# Patient Record
Sex: FEMALE | Race: WHITE | NOT HISPANIC OR LATINO | Employment: UNEMPLOYED | URBAN - METROPOLITAN AREA
[De-identification: names, ages, dates, MRNs, and addresses within clinical notes are randomized per-mention and may not be internally consistent; named-entity substitution may affect disease eponyms.]

---

## 2017-11-06 ENCOUNTER — CONVERSION ENCOUNTER (OUTPATIENT)
Dept: GENERAL RADIOLOGY | Facility: HOSPITAL | Age: 57
End: 2017-11-06

## 2018-08-28 ENCOUNTER — OFFICE VISIT CONVERTED (OUTPATIENT)
Dept: OTOLARYNGOLOGY | Facility: CLINIC | Age: 58
End: 2018-08-28
Attending: OTOLARYNGOLOGY

## 2018-10-08 ENCOUNTER — OFFICE VISIT CONVERTED (OUTPATIENT)
Dept: OTOLARYNGOLOGY | Facility: CLINIC | Age: 58
End: 2018-10-08
Attending: OTOLARYNGOLOGY

## 2019-02-05 ENCOUNTER — HOSPITAL ENCOUNTER (OUTPATIENT)
Dept: GENERAL RADIOLOGY | Facility: HOSPITAL | Age: 59
Discharge: HOME | End: 2019-02-05
Attending: NURSE PRACTITIONER

## 2019-11-05 ENCOUNTER — HOSPITAL ENCOUNTER (OUTPATIENT)
Dept: URGENT CARE | Facility: CLINIC | Age: 59
Discharge: HOME | End: 2019-11-05
Attending: PHYSICIAN ASSISTANT

## 2020-02-12 ENCOUNTER — HOSPITAL ENCOUNTER (OUTPATIENT)
Dept: GENERAL RADIOLOGY | Facility: HOSPITAL | Age: 60
Discharge: HOME | End: 2020-02-12
Attending: NURSE PRACTITIONER

## 2021-03-03 ENCOUNTER — HOSPITAL ENCOUNTER (OUTPATIENT)
Dept: MAMMOGRAPHY | Facility: HOSPITAL | Age: 61
Discharge: HOME | End: 2021-03-03
Attending: NURSE PRACTITIONER

## 2021-05-11 ENCOUNTER — OFFICE VISIT CONVERTED (OUTPATIENT)
Dept: ORTHOPEDIC SURGERY | Facility: CLINIC | Age: 61
End: 2021-05-11
Attending: ORTHOPAEDIC SURGERY

## 2021-05-11 ENCOUNTER — CONVERSION ENCOUNTER (OUTPATIENT)
Dept: ORTHOPEDIC SURGERY | Facility: CLINIC | Age: 61
End: 2021-05-11

## 2021-05-16 VITALS
HEIGHT: 60 IN | SYSTOLIC BLOOD PRESSURE: 144 MMHG | OXYGEN SATURATION: 98 % | HEART RATE: 64 BPM | TEMPERATURE: 98.4 F | RESPIRATION RATE: 16 BRPM | BODY MASS INDEX: 25.13 KG/M2 | DIASTOLIC BLOOD PRESSURE: 85 MMHG | WEIGHT: 128 LBS

## 2021-05-16 VITALS
WEIGHT: 128 LBS | BODY MASS INDEX: 25.13 KG/M2 | RESPIRATION RATE: 16 BRPM | TEMPERATURE: 98.1 F | SYSTOLIC BLOOD PRESSURE: 148 MMHG | OXYGEN SATURATION: 99 % | DIASTOLIC BLOOD PRESSURE: 80 MMHG | HEIGHT: 60 IN | HEART RATE: 66 BPM

## 2021-06-03 ENCOUNTER — OFFICE VISIT CONVERTED (OUTPATIENT)
Dept: ORTHOPEDIC SURGERY | Facility: CLINIC | Age: 61
End: 2021-06-03
Attending: PHYSICIAN ASSISTANT

## 2021-06-05 NOTE — PROGRESS NOTES
Progress Note      Patient Name: Leeanna Jackson   Patient ID: [id number]   Sex: Female   Birthdate: Connie 3, 1960    Primary Care Provider: Марина MCARTHUR   Referring Provider: Марина MCARTHUR    Visit Date: Connie 3, 2021    Provider: Zaynab Lama PA-C   Location: [facility]   Location Address: [address] [place] [address]  [id number]   Location Phone: [phone]          Chief Complaint  · Follow up right ankle pain      History Of Present Illness  Leeanna Jackson is a 61 year old /White female who presents today to [facility]. Patient presents today for follow up of right distal fibula fracture sustained on 05/02/21. Patient has been in a short leg cast for the past 3 weeks and NWB. She states she has tolerated the cast well. She denies any pain at this time.       Past Medical History  Bronchitis; Chronic rhinitis; Chronic rhinosinusitis; Foot cramps; Fracture; Headache; Hearing loss; High cholesterol; Hyperlipemia; Reflux; Thyroid disorder; Tinnitus, bilateral         Past Surgical History  Appendectomy; Colonoscopy; EGD; Hysterectomy         Medication List  [place] Thyroid 30 mg oral tablet; azelastine 137 mcg (0.1 %) nasal aerosol,spray; multivitamin oral tablet; Vitamin D2 1,250 mcg (50,000 unit) oral capsule         Allergy List  NO KNOWN DRUG ALLERGIES; No known history of drug allergy       Allergies Reconciled  Family Medical History  Colon Neoplasm, Malignant; Heart Disease; Hypertension; Cancer, Unspecified; Breast Neoplasm; Prostate cancer; Osteoporosis         Social History  Alcohol (Never); Alcohol Use (Never); Homemaker.; lives with spouse; .; Recreational Drug Use (Never); Tobacco (Never)         Review of Systems  · Constitutional  o Denies  o : fever, chills, weight loss  · Cardiovascular  o Denies  o : chest pain, shortness of breath  · Gastrointestinal  o Denies  o : liver disease, heartburn, nausea, blood in  stools  · Genitourinary  o Denies  o : painful urination, blood in urine  · Integument  o Denies  o : rash, itching  · Neurologic  o Denies  o : headache, weakness, loss of consciousness  · Musculoskeletal  o Denies  o : painful, swollen joints  · Psychiatric  o Denies  o : drug/alcohol addiction, anxiety, depression      Vitals  Date Time BP Position Site L\R Cuff Size HR RR TEMP (F) WT  HT  BMI kg/m2 BSA m2 O2 Sat FR L/min FiO2 HC       06/03/2021 02:31 PM      78 - R   130lbs 0oz 5'   25.39 1.58 97 %            Physical Examination  · Constitutional  o Appearance  o : well developed, well-nourished, no obvious deformities present  · Head and Face  o Head  o :   § Inspection  § : normocephalic  o Face  o :   § Inspection  § : no facial lesions  · Eyes  o Conjunctivae  o : conjunctivae normal  o Sclerae  o : sclerae white  · Ears, Nose, Mouth and Throat  o Ears  o :   § External Ears  § : appearance within normal limits  § Hearing  § : intact  o Nose  o :   § External Nose  § : appearance normal  · Neck  o Inspection/Palpation  o : normal appearance  o Range of Motion  o : full range of motion  · Respiratory  o Respiratory Effort  o : breathing unlabored  o Inspection of Chest  o : normal appearance  o Auscultation of Lungs  o : no audible wheezing or rales  · Cardiovascular  o Heart  o : regular rate  · Gastrointestinal  o Abdominal Examination  o : soft and non-tender  · Skin and Subcutaneous Tissue  o General Inspection  o : intact, no rashes  · Psychiatric  o General  o : Alert and oriented x3  o Judgement and Insight  o : judgment and insight intact  o Mood and Affect  o : mood normal, affect appropriate  · Right Ankle/Foot  o Inspection  o : Ecchymosis on plantar aspect of foot. Mild swelling of dorsal aspect of foot. Patient able to plantar and dorsiflex the ankle with no pain. Patient able to wiggle toes. Sensation intact. Neurovascular intact. Posterior tibialis pulse 2+.  · In Office  Procedures  o View  o : AP/LATERAL  o Site  o : right, ankle   o Indication  o : Right ankle pain   o Study  o : X-rays ordered, taken in the office, and reviewed today.  o Xray  o : Well healing distal fibula fracture   o Comparative Data  o : Comparative Data found and reviewed today               Assessment  · Ankle fracture     824.8/S82.899A  · Right ankle pain     719.47/M25.571      Plan  · Orders  o Ankle (Right) 2 views X-Ray ([id number]) - 719.47/M25.571 - 06/03/2021  · Instructions  o Reviewed the patient's Past Medical, Social, and Family history as well as the ROS at today's visit, no changes.  o Call or return if worsening symptoms.  o X-ray ordered, taken and reviewed at this visit.  o Follow Up in 3 weeks.  o Patient placed in a walking boot today. WBAT. She will do gentle ROM exercises 3 times daily. Follow up in 3 weeks with repeat films at that time.  · Referrals  o ID: [id number] Date: 05/05/2021 Type: Inbound  Specialty: Orthopedic Surgery            Electronically Signed by: Zaynab Lama PA-C -Author on Connie 3, 2021 03:41:48 PM

## 2021-06-05 NOTE — H&P
History and Physical      Patient Name: Leeanna Jackson   Patient ID: [id number]   Sex: Female   Birthdate: Connie 3, 1960    Primary Care Provider: Марина MCARTHUR   Referring Provider: Марина MCARTHUR    Visit Date: May 11, 2021    Provider: Janessa Aaron MD   Location: [facility]   Location Address: [address] [place] [address]  [id number]   Location Phone: [phone]          Chief Complaint  · Right Ankle Injury      History Of Present Illness  Leeanna Jackson is a 60 year old /White female who presents today to [facility].      Patient presents today for an evaluation of right ankle/foot. Patient presents today using a scooter for ambulation assistance. Patient missed the bottom step of her front porch on 5/2/21 resulting in immediate pain and swelling in her right ankle. She went to the emergency room and obtained x-rays. She was placed into a splint and referred to [facility] for further evaluation. She was given crutches but couldn't use them properly without falling so she transitioned to a scooter.       Past Medical History  Bronchitis; Chronic rhinitis; Chronic rhinosinusitis; Foot cramps; Fracture; Headache; Hearing loss; High cholesterol; Thyroid disorder; Tinnitus, bilateral         Past Surgical History  Appendectomy; Colonoscopy; EGD; Hysterectomy         Medication List  [place] Thyroid 30 mg oral tablet; azelastine 137 mcg (0.1 %) nasal aerosol,spray; multivitamin oral tablet; Vitamin D2 1,250 mcg (50,000 unit) oral capsule         Allergy List  No known history of drug allergy       Allergies Reconciled  Family Medical History  Colon Neoplasm, Malignant; Hypertension; Breast Neoplasm; Prostate cancer         Social History  Alcohol (Never); Tobacco (Never)         Review of Systems  · Constitutional  o Denies  o : fever, chills, weight loss  · Cardiovascular  o Denies  o : chest pain, shortness of breath  · Gastrointestinal  o Denies  o : liver  disease, heartburn, nausea, blood in stools  · Genitourinary  o Denies  o : painful urination, blood in urine  · Integument  o Denies  o : rash, itching  · Neurologic  o Denies  o : headache, weakness, loss of consciousness  · Musculoskeletal  o Denies  o : painful, swollen joints  · Psychiatric  o Denies  o : drug/alcohol addiction, anxiety, depression      Vitals  Date Time BP Position Site L\R Cuff Size HR RR TEMP (F) WT  HT  BMI kg/m2 BSA m2 O2 Sat FR L/min FiO2        05/11/2021 01:08 PM      78 - R   133lbs 0oz 5'   25.97 1.6 97 %            Physical Examination  · Constitutional  o Appearance  o : well developed, well-nourished, no obvious deformities present  · Head and Face  o Head  o :   § Inspection  § : normocephalic  o Face  o :   § Inspection  § : no facial lesions  · Eyes  o Conjunctivae  o : conjunctivae normal  o Sclerae  o : sclerae white  · Ears, Nose, Mouth and Throat  o Ears  o :   § External Ears  § : appearance within normal limits  § Hearing  § : intact  o Nose  o :   § External Nose  § : appearance normal  · Neck  o Inspection/Palpation  o : normal appearance  o Range of Motion  o : full range of motion  · Respiratory  o Respiratory Effort  o : breathing unlabored  o Inspection of Chest  o : normal appearance  o Auscultation of Lungs  o : no audible wheezing or rales  · Cardiovascular  o Heart  o : regular rate  · Gastrointestinal  o Abdominal Examination  o : soft and non-tender  · Skin and Subcutaneous Tissue  o General Inspection  o : intact, no rashes  · Psychiatric  o General  o : Alert and oriented x3  o Judgement and Insight  o : judgment and insight intact  o Mood and Affect  o : mood normal, affect appropriate  · Right Ankle/Foot  o Inspection  o : Skin intact. Sensation grossly intact. Neurovascular intact. Dorsal Pedal Pulse 2+, posterior tibialis pulse 2+. Swelling. Calf supple, non-tender. Scooter for ambulation assistance. Tenderness about the medial ankle. Patient able to  wiggle toes. Brisk capillary refill. Bruising about the ankle and up to the calf.   · In Office Procedures  o View  o : AP/LATERAL  o Site  o : right, ankle   o Indication  o : Right ankle pain   o Study  o : X-rays ordered, taken in the office, and reviewed today.  o Xray  o : Demonstrates a nondisplaced fracture of the right distal fibula.   · Casting  o Extremity  o : Right Ankle, Short Leg Cast  o Procedure  o : Closed treatment was obtained and fiberglass cast was applied. The patient tolerated the procedure without any complications  · Imaging  o Imaging  o : 5/2/21 FOOT X-RAY: 1. There is an acute nondisplaced fracture of the distal right fibula 2. Tiny avulsion fractures involving the medial aspect of the right ankle are possible. 3. No dislocation. [JHONNY XRAY] There is an acute nondisplaced fracture of the distal right fibula 2. Tiny avulsion fractures involving the medial aspect of the right ankle are possible. 3. No dislocation.           Assessment  · Right distal fibula fracture     824.8/S82.899A  · Right ankle pain     719.47/M25.571      Plan  · Orders  o Casting Supplies (Short Leg) Adult () - 719.47/M25.571 - 05/11/2021   NB  o Application of short leg cast (41767) - 719.47/M25.571 - 05/11/2021   NB  o Ankle (Right) 2 views X-Ray ([id number]) - 719.47/M25.571 - 05/11/2021  · Medications  o Medications have been Reconciled  o Transition of Care or Provider Policy  · Instructions  o Dr. Aaron saw and examined the patient and agrees with plan.   o X-rays reviewed by Dr. Aaron.  o Reviewed the patient's Past Medical, Social, and Family history as well as the ROS at today's visit, no changes.  o Call or return if worsening symptoms.  o Follow Up in 3-4 weeks.   o The above service was scribed by Robyn Almodovar on my behalf and I attest to the accuracy of the note. mc  o Discussed treatment plans and diagnosis with the patient. Patient is to continue the use of the scooter. We placed patient into a  cast and was educated on cast care. We will get repeat films next visit. Cast will be removed next visit and we will determine transitioning patient from cast to boot at that time.   · Referrals  o ID: [id number] Date: 05/05/2021 Type: Inbound  Specialty: Orthopedic Surgery            Electronically Signed by: Robyn Almodovar-Rodrick Rincon -Author on May 12, 2021 01:15:00 PM  Electronically Co-signed by: Janessa Aaron MD -Reviewer on May 12, 2021 10:04:37 PM

## 2021-06-22 ENCOUNTER — OFFICE VISIT (OUTPATIENT)
Dept: ORTHOPEDIC SURGERY | Facility: CLINIC | Age: 61
End: 2021-06-22

## 2021-06-22 VITALS — BODY MASS INDEX: 24.94 KG/M2 | HEIGHT: 60 IN | WEIGHT: 127 LBS | HEART RATE: 76 BPM | OXYGEN SATURATION: 97 %

## 2021-06-22 DIAGNOSIS — S82.831D OTHER CLOSED FRACTURE OF DISTAL END OF RIGHT FIBULA WITH ROUTINE HEALING, SUBSEQUENT ENCOUNTER: ICD-10-CM

## 2021-06-22 DIAGNOSIS — M25.571 RIGHT ANKLE PAIN, UNSPECIFIED CHRONICITY: Primary | ICD-10-CM

## 2021-06-22 PROCEDURE — 99213 OFFICE O/P EST LOW 20 MIN: CPT | Performed by: PHYSICIAN ASSISTANT

## 2021-06-22 RX ORDER — ERGOCALCIFEROL 1.25 MG/1
50000 CAPSULE ORAL
COMMUNITY

## 2021-06-22 RX ORDER — LEVOTHYROXINE AND LIOTHYRONINE 19; 4.5 UG/1; UG/1
30 TABLET ORAL DAILY
COMMUNITY

## 2021-06-22 NOTE — PROGRESS NOTES
"Chief Complaint  Follow-up and Pain of the Right Ankle    Subjective          Leeanna Jackson presents to [facility] for follow up of right distal fibula fracture sustained on 05/02/12. Patient was in a short leg cast for 3 weeks and has been in a walking boot, WBAT for the last 3 weeks. She states she has been doing some activity and walking out of the boot without any issue. She denies any pain, numbness or tingling.     Objective   Vital Signs:   Pulse 76   Ht 152.4 cm (60\")   Wt 57.6 kg (127 lb)   SpO2 97%   BMI 24.80 kg/m²       Physical Exam  Constitutional:       Appearance: Normal appearance. He is well-developed and normal weight.   HENT:      Head: Normocephalic.      Right Ear: Hearing and external ear normal.      Left Ear: Hearing and external ear normal.      Nose: Nose normal.   Eyes:      Conjunctiva/sclera: Conjunctivae normal.   Cardiovascular:      Rate and Rhythm: Normal rate.   Pulmonary:      Effort: Pulmonary effort is normal.      Breath sounds: No wheezing or rales.   Abdominal:      Palpations: Abdomen is soft.      Tenderness: There is no abdominal tenderness.   Musculoskeletal:      Cervical back: Normal range of motion.   Skin:     Findings: No rash.   Neurological:      Mental Status: He is alert and oriented to person, place, and time.   Psychiatric:         Mood and Affect: Mood and affect normal.         Judgment: Judgment normal.     Ortho Exam  Right ankle: Full weightbearing.  Gait is normal without use of boot.  Mild swelling across the joint.  Nontender at the fracture site.  No discoloration or atrophy.  Full active range of motion with plantar and dorsiflexion.  Ankle strength is 5/5.  Sensation is intact.  Calf is soft and nontender.  Neurovascular intact.  Result Review :   The following data was reviewed by: YOLI Mcgregor on 06/22/2021:    Data reviewed: Radiologic studies 06/22/21     Imaging Results (Most Recent)     Procedure " Component Value Units Date/Time    XR Ankle 3+ View Right [[id number]] Resulted: 06/22/21 1204     Updated: 06/22/21 1205    Narrative:      X-Ray Report:  Study: X-rays ordered, taken in the office, and reviewed today  Site: right ankle  Xray  Indication: right ankle pain  View: AP and Lateral view(s)  Findings: Well healing distal fibula fracture.  Prior studies available for comparison: yes                 Assessment and Plan 2   Problem List Items Addressed This Visit        Musculoskeletal and Injuries    Closed fracture of distal end of right fibula with routine healing    Right ankle pain - Primary    Relevant Orders    XR Ankle 3+ View Right (Completed)          Follow Up   Return if symptoms worsen or fail to improve.  Patient Instructions   Patient able to d/c boot and able to increase activity level as tolerated by patient. Continue with exercises provided in the office today  F/u should she regress or fail to improve.   Fibular Fracture Rehab  Ask your health care provider which exercises are safe for you. Do exercises exactly as told by your health care provider and adjust them as directed. It is normal to feel mild stretching, pulling, tightness, or discomfort as you do these exercises. Stop the exercise right away if you feel sudden pain or your pain gets worse. Do not begin these exercises until told by your health care provider.  Stretching and range-of-motion exercises  These exercises warm up your muscles and joints and improve the movement and flexibility of your lower leg. The exercises also help to increase range of motion, and relieve pain.  Calf stretch, seated  This exercise is sometimes called gastrocnemius stretch, seated. To do this exercise:  1. Sit with your left / right leg extended.  2. Hold onto both ends of a belt or towel and loop it around the ball of your left / right foot. The ball of your foot is on the walking surface, right under your toes.  3. Keep your left / right ankle and  foot relaxed and keep your knee straight while you use the belt or towel to pull your foot and ankle toward you. You should feel a gentle stretch behind your calf or knee.  4. Hold this position for __________ seconds.  Repeat __________ times. Complete this exercise __________ times a day.  Ankle alphabet    1. Sit with your left / right leg supported at the lower leg.  ? Do not rest your foot on anything.  ? Make sure your foot has room to move freely.  2. Think of your left / right foot as a paintbrush, and move your foot to trace each letter of the alphabet in the air. Keep your hip and knee still while you trace. Make the letters as large as you can without feeling discomfort.  3. Trace every letter from A to Z.  Repeat __________ times. Complete this exercise __________ times a day.  Ankle dorsiflexion, passive  This is a stretching exercise in which your ankle moves with the help of another part of your body.  1. Remove your shoes and sit on a chair that is placed on a non-carpeted surface.  2. Place your left / right foot on the floor, directly under your knee. Extend your other leg for support.  3. Keeping your heel down, slide your left / right foot back toward the chair until you feel a stretch at your ankle or calf. If you do not feel a stretch, slide your buttocks forward to the edge of the chair while still keeping your heel down.  4. Hold this stretch for __________ seconds.  Repeat __________ times. Complete this exercise __________ times a day.  Strengthening exercises  These exercises build strength and endurance in your lower leg. Endurance is the ability to use your muscles for a long time, even after they get tired.  Ankle dorsiflexion    1. Secure a rubber exercise band or tubing to a fixed object, such as a table or pole.  2. Secure the other end of the band around your left / right foot.  3. Sit on the floor, facing the fixed object. The band should be slightly tense when your foot is  relaxed.  4. Slowly use your ankle muscles to pull the top of your foot toward you (dorsiflexion).  5. Hold this position for __________ seconds.  6. Slowly release the tension in the band and return your foot to the starting position.  Repeat __________ times. Complete this exercise __________ times a day.  Ankle plantar flexion    1. Sit with your left / right leg extended.  2. Loop a rubber exercise band or tubing around the ball of your left / right foot. The ball of your foot is on the walking surface, right under your toes.  3. Hold onto both ends of the band or tubing.  4. Slowly push your foot and toes away from you, pointing them downward (plantar flexion).  5. Hold this position for __________ seconds.  6. Control the tension in the band as you slowly return to the starting position.  Repeat __________ times. Complete this exercise __________ times a day.  Ankle eversion with band  1. Secure one end of a rubber exercise band or tubing to a fixed object, such as a table leg or a pole, that will stay still when the band is pulled.  2. Loop the other end of the band around the middle of your left / right foot.  3. Sit on the floor, facing the fixed object. The band should be slightly tense when your foot is relaxed.  4. Make fists with your hands and put them between your knees. This will focus your strengthening at your ankle.  5. Leading with your little toe, slowly push your banded foot outward, away from your body (eversion). Make sure the band is positioned to resist the entire motion. Do not move your heel.  6. Hold this position for __________ seconds.  7. Control the tension in the band as you slowly return to the starting position.  Repeat __________ times. Complete this exercise __________ times a day.  Ankle inversion with band  1. Secure one end of a rubber exercise band or tubing to a fixed object, such as a table leg or a pole, that will stay still when the band is pulled.  2. Loop the other end  of the band around your left / right foot, near your toes.  3. Sit on the floor, facing the fixed object. The band should be slightly tense when your foot is relaxed.  4. Make fists with your hands and put them between your knees. This will focus your strengthening at your ankle.  5. Leading with your big toe, slowly pull your banded foot inward, toward your other leg (inversion). Make sure the band is positioned to resist the entire motion. Do not move your heel.  6. Hold this position for __________ seconds.  7. Control the tension in the band as you slowly return to the starting position.  Repeat __________ times. Complete this exercise __________ times a day.  This information is not intended to replace advice given to you by your health care provider. Make sure you discuss any questions you have with your health care provider.  Document Revised: 04/09/2020 Document Reviewed: 09/17/2019  [employer] Patient Education © 2021 [employer]      Patient was given instructions and counseling regarding her condition or for health maintenance advice. Please see specific information pulled into the AVS if appropriate.

## 2021-06-22 NOTE — PATIENT INSTRUCTIONS
Patient able to d/c boot and able to increase activity level as tolerated by patient. Continue with exercises provided in the office today  F/u should she regress or fail to improve.   Fibular Fracture Rehab  Ask your health care provider which exercises are safe for you. Do exercises exactly as told by your health care provider and adjust them as directed. It is normal to feel mild stretching, pulling, tightness, or discomfort as you do these exercises. Stop the exercise right away if you feel sudden pain or your pain gets worse. Do not begin these exercises until told by your health care provider.  Stretching and range-of-motion exercises  These exercises warm up your muscles and joints and improve the movement and flexibility of your lower leg. The exercises also help to increase range of motion, and relieve pain.  Calf stretch, seated  This exercise is sometimes called gastrocnemius stretch, seated. To do this exercise:  1. Sit with your left / right leg extended.  2. Hold onto both ends of a belt or towel and loop it around the ball of your left / right foot. The ball of your foot is on the walking surface, right under your toes.  3. Keep your left / right ankle and foot relaxed and keep your knee straight while you use the belt or towel to pull your foot and ankle toward you. You should feel a gentle stretch behind your calf or knee.  4. Hold this position for __________ seconds.  Repeat __________ times. Complete this exercise __________ times a day.  Ankle alphabet    1. Sit with your left / right leg supported at the lower leg.  ? Do not rest your foot on anything.  ? Make sure your foot has room to move freely.  2. Think of your left / right foot as a paintbrush, and move your foot to trace each letter of the alphabet in the air. Keep your hip and knee still while you trace. Make the letters as large as you can without feeling discomfort.  3. Trace every letter from A to Z.  Repeat __________ times.  Complete this exercise __________ times a day.  Ankle dorsiflexion, passive  This is a stretching exercise in which your ankle moves with the help of another part of your body.  1. Remove your shoes and sit on a chair that is placed on a non-carpeted surface.  2. Place your left / right foot on the floor, directly under your knee. Extend your other leg for support.  3. Keeping your heel down, slide your left / right foot back toward the chair until you feel a stretch at your ankle or calf. If you do not feel a stretch, slide your buttocks forward to the edge of the chair while still keeping your heel down.  4. Hold this stretch for __________ seconds.  Repeat __________ times. Complete this exercise __________ times a day.  Strengthening exercises  These exercises build strength and endurance in your lower leg. Endurance is the ability to use your muscles for a long time, even after they get tired.  Ankle dorsiflexion    1. Secure a rubber exercise band or tubing to a fixed object, such as a table or pole.  2. Secure the other end of the band around your left / right foot.  3. Sit on the floor, facing the fixed object. The band should be slightly tense when your foot is relaxed.  4. Slowly use your ankle muscles to pull the top of your foot toward you (dorsiflexion).  5. Hold this position for __________ seconds.  6. Slowly release the tension in the band and return your foot to the starting position.  Repeat __________ times. Complete this exercise __________ times a day.  Ankle plantar flexion    1. Sit with your left / right leg extended.  2. Loop a rubber exercise band or tubing around the ball of your left / right foot. The ball of your foot is on the walking surface, right under your toes.  3. Hold onto both ends of the band or tubing.  4. Slowly push your foot and toes away from you, pointing them downward (plantar flexion).  5. Hold this position for __________ seconds.  6. Control the tension in the band as  you slowly return to the starting position.  Repeat __________ times. Complete this exercise __________ times a day.  Ankle eversion with band  1. Secure one end of a rubber exercise band or tubing to a fixed object, such as a table leg or a pole, that will stay still when the band is pulled.  2. Loop the other end of the band around the middle of your left / right foot.  3. Sit on the floor, facing the fixed object. The band should be slightly tense when your foot is relaxed.  4. Make fists with your hands and put them between your knees. This will focus your strengthening at your ankle.  5. Leading with your little toe, slowly push your banded foot outward, away from your body (eversion). Make sure the band is positioned to resist the entire motion. Do not move your heel.  6. Hold this position for __________ seconds.  7. Control the tension in the band as you slowly return to the starting position.  Repeat __________ times. Complete this exercise __________ times a day.  Ankle inversion with band  1. Secure one end of a rubber exercise band or tubing to a fixed object, such as a table leg or a pole, that will stay still when the band is pulled.  2. Loop the other end of the band around your left / right foot, near your toes.  3. Sit on the floor, facing the fixed object. The band should be slightly tense when your foot is relaxed.  4. Make fists with your hands and put them between your knees. This will focus your strengthening at your ankle.  5. Leading with your big toe, slowly pull your banded foot inward, toward your other leg (inversion). Make sure the band is positioned to resist the entire motion. Do not move your heel.  6. Hold this position for __________ seconds.  7. Control the tension in the band as you slowly return to the starting position.  Repeat __________ times. Complete this exercise __________ times a day.  This information is not intended to replace advice given to you by your health care  provider. Make sure you discuss any questions you have with your health care provider.  Document Revised: 04/09/2020 Document Reviewed: 09/17/2019  Elsevier Patient Education © 2021 [employer]

## 2021-07-02 ENCOUNTER — OFFICE VISIT (OUTPATIENT)
Dept: OTOLARYNGOLOGY | Facility: CLINIC | Age: 61
End: 2021-07-02

## 2021-07-02 VITALS — WEIGHT: 130.2 LBS | BODY MASS INDEX: 25.56 KG/M2 | HEIGHT: 60 IN | TEMPERATURE: 96 F

## 2021-07-02 DIAGNOSIS — J31.0 CHRONIC RHINITIS: Primary | ICD-10-CM

## 2021-07-02 DIAGNOSIS — R42 DIZZINESS: ICD-10-CM

## 2021-07-02 DIAGNOSIS — H93.13 TINNITUS OF BOTH EARS: ICD-10-CM

## 2021-07-02 DIAGNOSIS — H90.3 SENSORINEURAL HEARING LOSS (SNHL) OF BOTH EARS: ICD-10-CM

## 2021-07-02 PROCEDURE — 99214 OFFICE O/P EST MOD 30 MIN: CPT | Performed by: OTOLARYNGOLOGY

## 2021-07-02 PROCEDURE — 92538 CALORIC VSTBLR TEST W/REC: CPT | Performed by: OTOLARYNGOLOGY

## 2021-07-02 RX ORDER — IBUPROFEN 800 MG/1
TABLET ORAL SEE ADMIN INSTRUCTIONS
COMMUNITY
Start: 2021-05-05 | End: 2021-09-02

## 2021-07-02 RX ORDER — FLUTICASONE PROPIONATE 50 MCG
2 SPRAY, SUSPENSION (ML) NASAL DAILY
Qty: 16 G | Refills: 6 | Status: SHIPPED | OUTPATIENT
Start: 2021-07-02 | End: 2022-07-22

## 2021-07-02 RX ORDER — AZELASTINE 1 MG/ML
2 SPRAY, METERED NASAL 2 TIMES DAILY
Qty: 30 ML | Refills: 11 | Status: SHIPPED | OUTPATIENT
Start: 2021-07-02 | End: 2022-07-22

## 2021-07-02 NOTE — PROGRESS NOTES
Patient Name: Leeanna Jackson   Visit Date: 07/02/2021   Patient ID: [id number]  Provider: William Gonzalez MD    Sex: female  Location: [facility]   YOB: 1960  Location Address: [address] [place] [address], [address] [place] [address],?[place]?[id number]    Primary Care Provider Марина Storey APRN  Location Phone: [phone]    Referring Provider: No ref. provider found        Chief Complaint  No chief complaint on file.    History of Present Illness  Leeanna Jackson is a 61 y.o. female who presents to [facility] today as a consult from No ref. provider found for evaluation of her nose. She was originally seen on 8/28/18 at which time she was complaining of bilateral head pressure, postnasal drainage, throat clearing, ear pressure, and diminished sense of smell since March 2018.  She had tried Flonase and antihistamines without improvement.  She was also experiencing bilateral high-pitched tinnitus and hearing loss which had been getting worse since suffering from a dizzy episode in March.  She felt like the inside of her head was spinning around and this lasted around 1 day.  On examination that day her nasal mucosa is quite edematous and erythematous and she was started on Augmentin and Astepro. Audiogram on 10/8/18 revealed right normal downsloping to mild to moderately severe sensorineural hearing loss in the left normal downsloping to mild to moderate sensorineural hearing loss.  SRT is 15 bilaterally. Speech discrimination is 100% bilaterally at 60 dB.  Tympanograms were type A.    She reports frequent throat clearing, yellow to blood tinged nasal mucous, and occasional nasal congestion.  She is not having any significant anterior rhinorrhea and denies any facial pain or pressure.  Her symptoms are year-round and fairly constant.  She has tried zyrtec but it made drowsy.  She has also tried Claritin and nasal steroids.  She underwent a CT scan which I do  "not have available to me but this evidently demonstrated asymmetry of the nasopharynx. She also mentions intermittent episodes of vertigo and remains quite concerned by her high-pitched tinnitus and hearing loss.    Past Medical History:   Diagnosis Date   • Acid reflux    • Bronchitis    • Chronic rhinitis    • Chronic rhinosinusitis    • Foot cramps    • Fracture    • Headache    • Hearing loss    • High cholesterol    • Hyperlipemia    • Thyroid disorder    • Tinnitus, bilateral        Past Surgical History:   Procedure Laterality Date   • APPENDECTOMY     • COLONOSCOPY  2016   • ENDOSCOPY  2011, 2016   • HYSTERECTOMY           Current Outpatient Medications:   •  ergocalciferol (ERGOCALCIFEROL) 1.25 MG (85450 UT) capsule, 50,000 Units Every 14 (Fourteen) Days., Disp: , Rfl:   •  Thyroid (ARMOUR THYROID) 30 MG PO tablet, [place] Thyroid 30 mg oral tablet take 1 tablet (30 mg) by oral route once daily   Active, Disp: , Rfl:   •  azelastine (ASTELIN) 0.1 % nasal spray, 2 sprays into the nostril(s) as directed by provider 2 (Two) Times a Day. Use in each nostril as directed, Disp: 30 mL, Rfl: 11  •  fluticasone (Flonase) 50 MCG/ACT nasal spray, 2 sprays into the nostril(s) as directed by provider Daily. Administer 2 sprays in each nostril for each dose., Disp: 16 g, Rfl: 6  •  ibuprofen (ADVIL,MOTRIN) 800 MG tablet, Take  by mouth See Admin Instructions. Take 1, Disp: , Rfl:      No Known Allergies    Social History     Tobacco Use   • Smoking status: Never Smoker   • Smokeless tobacco: Never Used   Substance Use Topics   • Alcohol use: Never   • Drug use: Never        Objective     Vital Signs:   Temp 96 °F (35.6 °C) (Temporal)   Ht 152.4 cm (60\")   Wt 59.1 kg (130 lb 3.2 oz)   BMI 25.43 kg/m²       Physical Exam    General: Well developed, well nourished patient of stated age in no acute distress. Voice is strong and clear.   Head: Normocephalic and atraumatic.  Face: No lesions.  Bilateral parotid and " submandibular glands are unremarkable.  Stensen's and Warthin's ducts are productive of clear saliva bilaterally.  House-Brackmann I/VI     bilaterally.   muscles and temporomandibular joint nontender to palpation.  No TMJ crepitus.  Eyes: PERRLA, sclerae anicteric, no conjunctival injection. Extra ocular movements are intact and full. No nystagmus.   Ears: Auricles are normal in appearance. Bilateral external auditory canals are unremarkable. Bilateral tympanic membranes are clear and without effusion. Hearing normal to conversational voice.   Nose: External nose is normal in appearance. Bilateral nares are patent with slightly edematous appearing mucosa. Septum midline. Turbinates are unremarkable. No lesions.   Oral Cavity: Lips are normal in appearance. Oral mucosa is unremarkable. Gingiva is unremarkable. Normal dentition for age. Tongue is unremarkable with good movement. Hard palate is unremarkable.   Oropharynx: Soft palate is unremarkable with full movement. Uvula is unremarkable. Bilateral tonsils are unremarkable. Posterior oropharynx is unremarkable.    Larynx and hypopharynx: Deferred secondary to gag reflex.  Neck: Supple.  No mass.  Nontender to palpation.  Trachea midline. Thyroid normal size and without nodules to palpation.   Lymphatic: No lymphadenopathy upon palpation.  Respiratory: Clear to auscultation bilaterally, nonlabored respirations    Cardiovascular: RRR, no murmurs, rubs, or gallops,   Psychiatric: Appropriate affect, cooperative   Neurologic: Oriented x 3, strength symmetric in all extremities, Cranial Nerves II-XII are grossly intact to confrontation   Skin: Warm and dry. No rashes.    Procedures     Diagnostic nasal endoscopy:    Indications: Bilateral discolored rhinorrhea in a patient with inability to visualize the middle meatus on anterior rhinoscopy.    Summary: Patient's bilateral nares were decongested and anesthetized with Afrin and lidocaine sprays respectively.  After giving the medications ample time to take effect a 0° rigid endoscope was inserted in the bilateral nares revealing a midline nasal septum. The bilateral inferior and middle turbinates were normal in appearance. The middle meati, olfactory clefts, and sphenoethmoidal recesses were without pus, polyps, or lesion bilaterally. The nasopharynx and bilateral eustachian tube orifices were without mass or lesion. The nasal mucosa was normal in appearance. The patient tolerated the procedure well.    Result Review :               Assessment and Plan    Diagnoses and all orders for this visit:    1. Chronic rhinitis (Primary)  -     azelastine (ASTELIN) 0.1 % nasal spray; 2 sprays into the nostril(s) as directed by provider 2 (Two) Times a Day. Use in each nostril as directed  Dispense: 30 mL; Refill: 11  -     fluticasone (Flonase) 50 MCG/ACT nasal spray; 2 sprays into the nostril(s) as directed by provider Daily. Administer 2 sprays in each nostril for each dose.  Dispense: 16 g; Refill: 6    2. Sensorineural hearing loss (SNHL) of both ears  -     Audiometry With Tympanometry; Future    3. Tinnitus of both ears  -     Audiometry With Tympanometry; Future    4. Dizziness  -     MRI Internal Auditory Canal With Wo; Future  -     DE CALORIC VESTIBULAR TEST W/REC BI MONOTHERMAL    Impressions and findings were discussed. Unfortunately, she continues to experience nearly constant rhinorrhea/nasal congestion, and frequent throat clearing. She had undergone a CT scan which I do not have available to me today that was concerning for asymmetry of the pharyngeal soft tissues which on direct visualization today are unremarkable appearing. She also remains concerned by intermittent vertigo which she had been seen before previously. We had ordered an MRI and video nystagmography testing at the time but these were never completed. She would like to have this ordered again for further evaluation. In regards to her nose we  discussed that this is likely consistent with chronic rhinitis. Options for management were discussed including thyroid repeat trial of medical management versus referral for allergy testing and potential immunotherapy. After a thorough discussion she would like to repeat medical management. We discussed the importance of using the sprays consistently and she will follow up after testing or sooner if needed.        Follow Up   No follow-ups on file.  Patient was given instructions and counseling regarding her condition or for health maintenance advice. Please see specific information pulled into the AVS if appropriate.

## 2021-07-15 VITALS — HEART RATE: 78 BPM | BODY MASS INDEX: 26.11 KG/M2 | WEIGHT: 133 LBS | HEIGHT: 60 IN | OXYGEN SATURATION: 97 %

## 2021-07-15 VITALS — OXYGEN SATURATION: 97 % | HEIGHT: 60 IN | WEIGHT: 130 LBS | HEART RATE: 78 BPM | BODY MASS INDEX: 25.52 KG/M2

## 2021-07-20 ENCOUNTER — CLINICAL SUPPORT (OUTPATIENT)
Dept: GASTROENTEROLOGY | Facility: CLINIC | Age: 61
End: 2021-07-20

## 2021-07-20 ENCOUNTER — PREP FOR SURGERY (OUTPATIENT)
Dept: OTHER | Facility: HOSPITAL | Age: 61
End: 2021-07-20

## 2021-07-20 ENCOUNTER — TELEPHONE (OUTPATIENT)
Dept: GASTROENTEROLOGY | Facility: CLINIC | Age: 61
End: 2021-07-20

## 2021-07-20 DIAGNOSIS — Z86.010 HISTORY OF COLON POLYPS: Primary | ICD-10-CM

## 2021-07-20 PROBLEM — Z86.0100 HISTORY OF COLON POLYPS: Status: ACTIVE | Noted: 2021-07-20

## 2021-07-20 RX ORDER — SODIUM, POTASSIUM,MAG SULFATES 17.5-3.13G
1 SOLUTION, RECONSTITUTED, ORAL ORAL EVERY 12 HOURS
Qty: 254 ML | Refills: 0 | Status: ON HOLD | OUTPATIENT
Start: 2021-07-20 | End: 2022-07-25

## 2021-07-20 NOTE — PROGRESS NOTES
Spoke with pt on a date for colon of 09/20/2021. Updated chart with meds and history. Went over prep instructions and mailed out. Put in order for colon and sent in prep.

## 2021-07-26 ENCOUNTER — HOSPITAL ENCOUNTER (OUTPATIENT)
Dept: MRI IMAGING | Facility: HOSPITAL | Age: 61
Discharge: HOME | End: 2021-07-26
Admitting: OTOLARYNGOLOGY

## 2021-07-26 DIAGNOSIS — R42 DIZZINESS: ICD-10-CM

## 2021-07-26 LAB — CREAT BLDA-MCNC: 1.2 MG/DL

## 2021-07-26 PROCEDURE — 82565 ASSAY OF CREATININE: CPT

## 2021-07-26 PROCEDURE — A9577 INJ MULTIHANCE: HCPCS | Performed by: OTOLARYNGOLOGY

## 2021-07-26 PROCEDURE — 0 GADOBENATE DIMEGLUMINE 529 MG/ML SOLUTION: Performed by: OTOLARYNGOLOGY

## 2021-07-26 PROCEDURE — 70553 MRI BRAIN STEM W/O & W/DYE: CPT

## 2021-07-26 RX ADMIN — GADOBENATE DIMEGLUMINE 13 ML: 529 INJECTION, SOLUTION INTRAVENOUS at 14:49

## 2021-08-13 ENCOUNTER — OFFICE VISIT (OUTPATIENT)
Dept: OTOLARYNGOLOGY | Facility: CLINIC | Age: 61
End: 2021-08-13

## 2021-08-13 VITALS — WEIGHT: 133.2 LBS | HEIGHT: 60 IN | TEMPERATURE: 97.5 F | BODY MASS INDEX: 26.15 KG/M2

## 2021-08-13 DIAGNOSIS — H90.3 SENSORINEURAL HEARING LOSS (SNHL) OF BOTH EARS: ICD-10-CM

## 2021-08-13 DIAGNOSIS — R42 DIZZINESS: Primary | ICD-10-CM

## 2021-08-13 DIAGNOSIS — J31.0 CHRONIC RHINITIS: ICD-10-CM

## 2021-08-13 DIAGNOSIS — H93.13 TINNITUS OF BOTH EARS: ICD-10-CM

## 2021-08-13 PROCEDURE — 99213 OFFICE O/P EST LOW 20 MIN: CPT | Performed by: OTOLARYNGOLOGY

## 2021-08-13 NOTE — PROGRESS NOTES
Patient Name: Leeanna Jackson   Visit Date: 07/02/2021   Patient ID: [id number]  Provider: William Gonzalez MD    Sex: female  Location: [facility]   YOB: 1960  Location Address: [address] [place] [address], [address] [place] [address],?[place]?[id number]    Primary Care Provider Марина Storey APRN  Location Phone: [phone]    Referring Provider: No ref. provider found        Chief Complaint  Results (MRI)    History of Present Illness  Leeanna Jackson is a 61 y.o. female with past medical history significant for migraines who presents to [facility] today as a consult from No ref. provider found for evaluation of her nose. She was originally seen on 8/28/18 at which time she was complaining of bilateral head pressure, postnasal drainage, throat clearing, ear pressure, and diminished sense of smell since March 2018.  She had tried Flonase and antihistamines without improvement.  She was also experiencing bilateral high-pitched tinnitus and hearing loss which had been getting worse since suffering from a dizzy episode in March.  She felt like the inside of her head was spinning around and this lasted around 1 day.  On examination that day her nasal mucosa is quite edematous and erythematous and she was started on Augmentin and Astepro. Audiogram on 10/8/18 revealed right normal downsloping to mild to moderately severe sensorineural hearing loss in the left normal downsloping to mild to moderate sensorineural hearing loss.  SRT is 15 bilaterally. Speech discrimination is 100% bilaterally at 60 dB.  Tympanograms were type A.    She tells me that the azelastine and fluticasone for her postnasal drainage and throat clearing.  She has not had any issues with epistaxis secondary to her nasal spray use.  She has not had issues with vertigo for quite some time.  Tinnitus is stable.  MRI of her brain with and without contrast on 7/26/2021 was unremarkable aside from  "minimal chronic small vessel ischemic disease.  There is no evidence of sinus, middle ear, or mastoid disease.     Past Medical History:   Diagnosis Date   • Acid reflux    • Bronchitis    • Chronic rhinitis    • Chronic rhinosinusitis    • Foot cramps    • Fracture    • Headache    • Hearing loss    • High cholesterol    • Hyperlipemia    • Thyroid disorder    • Tinnitus, bilateral        Past Surgical History:   Procedure Laterality Date   • APPENDECTOMY     • COLONOSCOPY  2016   • ENDOSCOPY  2011, 2016   • HYSTERECTOMY           Current Outpatient Medications:   •  azelastine (ASTELIN) 0.1 % nasal spray, 2 sprays into the nostril(s) as directed by provider 2 (Two) Times a Day. Use in each nostril as directed, Disp: 30 mL, Rfl: 11  •  ergocalciferol (ERGOCALCIFEROL) 1.25 MG (53022 UT) capsule, 50,000 Units Every 14 (Fourteen) Days., Disp: , Rfl:   •  ESTRIOL-PROGESTERONE MICRO TD, Take  by mouth 2 (two) times a day., Disp: , Rfl:   •  fluticasone (Flonase) 50 MCG/ACT nasal spray, 2 sprays into the nostril(s) as directed by provider Daily. Administer 2 sprays in each nostril for each dose., Disp: 16 g, Rfl: 6  •  sodium-potassium-magnesium sulfates (Suprep Bowel Prep Kit) 17.5-3.13-1.6 GM/177ML solution oral solution, Take 1 bottle by mouth Every 12 (Twelve) Hours., Disp: 254 mL, Rfl: 0  •  Thyroid (ARMOUR THYROID) 30 MG PO tablet, [place] Thyroid 30 mg oral tablet take 1 tablet (30 mg) by oral route once daily   Active, Disp: , Rfl:   •  ibuprofen (ADVIL,MOTRIN) 800 MG tablet, Take  by mouth See Admin Instructions. Take 1, Disp: , Rfl:      No Known Allergies    Social History     Tobacco Use   • Smoking status: Never Smoker   • Smokeless tobacco: Never Used   Substance Use Topics   • Alcohol use: Never   • Drug use: Never        Objective     Vital Signs:   Temp 97.5 °F (36.4 °C) (Temporal)   Ht 152.4 cm (60\")   Wt 60.4 kg (133 lb 3.2 oz)   BMI 26.01 kg/m²       Physical Exam    General: Well developed, well " nourished patient of stated age in no acute distress. Voice is strong and clear.   Head: Normocephalic and atraumatic.  Face: No lesions.  Bilateral parotid and submandibular glands are unremarkable.  Stensen's and Warthin's ducts are productive of clear saliva bilaterally.  House-Brackmann I/VI     bilaterally.   muscles and temporomandibular joint nontender to palpation.  No TMJ crepitus.  Eyes: PERRLA, sclerae anicteric, no conjunctival injection. Extra ocular movements are intact and full. No nystagmus.   Ears: Auricles are normal in appearance. Bilateral external auditory canals are unremarkable. Bilateral tympanic membranes are clear and without effusion. Hearing normal to conversational voice.   Nose: External nose is normal in appearance. Bilateral nares are patent with slightly edematous appearing mucosa. Septum midline. Turbinates are unremarkable. No lesions.   Oral Cavity: Lips are normal in appearance. Oral mucosa is unremarkable. Gingiva is unremarkable. Normal dentition for age. Tongue is unremarkable with good movement. Hard palate is unremarkable.   Oropharynx: Soft palate is unremarkable with full movement. Uvula is unremarkable. Bilateral tonsils are unremarkable. Posterior oropharynx is unremarkable.    Larynx and hypopharynx: Deferred secondary to gag reflex.  Neck: Supple.  No mass.  Nontender to palpation.  Trachea midline. Thyroid normal size and without nodules to palpation.   Lymphatic: No lymphadenopathy upon palpation.  Respiratory: Clear to auscultation bilaterally, nonlabored respirations    Cardiovascular: RRR, no murmurs, rubs, or gallops,   Psychiatric: Appropriate affect, cooperative   Neurologic: Oriented x 3, strength symmetric in all extremities, Cranial Nerves II-XII are grossly intact to confrontation   Skin: Warm and dry. No rashes.    Procedures       Result Review :               Assessment and Plan    Diagnoses and all orders for this visit:    1. Dizziness  (Primary)    2. Tinnitus of both ears    3. Chronic rhinitis    4. Sensorineural hearing loss (SNHL) of both ears         Impressions and findings were discussed.  Fortunately, this has improved and she may continue the azelastine and fluticasone as needed.  We discussed the potential for further evaluation of her dizziness with videonystagmography testing but she has done well recently and will hold off.  We discussed that it is often helpful to see patients when they are acutely vertiginous and she will call to arrange follow-up on an as-needed basis.      Follow Up   No follow-ups on file.  Patient was given instructions and counseling regarding her condition or for health maintenance advice. Please see specific information pulled into the AVS if appropriate.

## 2021-09-02 ENCOUNTER — PREP FOR SURGERY (OUTPATIENT)
Dept: OTHER | Facility: HOSPITAL | Age: 61
End: 2021-09-02

## 2021-09-02 ENCOUNTER — OFFICE VISIT (OUTPATIENT)
Dept: GASTROENTEROLOGY | Facility: CLINIC | Age: 61
End: 2021-09-02

## 2021-09-02 VITALS
SYSTOLIC BLOOD PRESSURE: 145 MMHG | BODY MASS INDEX: 25.86 KG/M2 | HEIGHT: 60 IN | OXYGEN SATURATION: 99 % | WEIGHT: 131.7 LBS | DIASTOLIC BLOOD PRESSURE: 72 MMHG | HEART RATE: 69 BPM

## 2021-09-02 DIAGNOSIS — K21.9 GASTROESOPHAGEAL REFLUX DISEASE, UNSPECIFIED WHETHER ESOPHAGITIS PRESENT: Primary | ICD-10-CM

## 2021-09-02 DIAGNOSIS — R19.4 ALTERED BOWEL HABITS: ICD-10-CM

## 2021-09-02 DIAGNOSIS — R11.2 NAUSEA AND VOMITING, INTRACTABILITY OF VOMITING NOT SPECIFIED, UNSPECIFIED VOMITING TYPE: ICD-10-CM

## 2021-09-02 DIAGNOSIS — K44.9 HIATAL HERNIA: ICD-10-CM

## 2021-09-02 DIAGNOSIS — R11.0 NAUSEA: ICD-10-CM

## 2021-09-02 DIAGNOSIS — Z80.0 FAMILY HISTORY OF COLON CANCER: ICD-10-CM

## 2021-09-02 DIAGNOSIS — Z12.11 SCREENING FOR COLON CANCER: ICD-10-CM

## 2021-09-02 DIAGNOSIS — K22.2 ESOPHAGEAL STRICTURE: ICD-10-CM

## 2021-09-02 PROCEDURE — 99204 OFFICE O/P NEW MOD 45 MIN: CPT | Performed by: NURSE PRACTITIONER

## 2021-09-02 RX ORDER — MULTIPLE VITAMINS W/ MINERALS TAB 9MG-400MCG
1 TAB ORAL DAILY
COMMUNITY

## 2021-09-02 RX ORDER — FAMOTIDINE 20 MG/1
20 TABLET, FILM COATED ORAL
Qty: 30 TABLET | Refills: 2 | Status: SHIPPED | OUTPATIENT
Start: 2021-09-02

## 2021-09-02 NOTE — PROGRESS NOTES
Chief Complaint  Difficulty Swallowing (Hiatal hernia), Colonoscopy (Patient scheduled 09/20/2021), and Heartburn    Leeanna Jackson is a 61 y.o. female who presents to [facility] is a new patient.    61-year-old female presenting the office today as a new patient with a history of difficulty swallowing, hiatal hernia, heartburn, esophageal stricture and in need of a screening colonoscopy. Patient reports epigastric pain with eating that causes hiccups and dysphagia. Dysphagia is occurring with meat and bread. She has been using essential oils for pain relief. Vomiting, that is mainly liquid occurring 1-2 times a week.  Patient is taking Tums as needed for breakthrough reflux at night.  Patient is not on any prescription medication for reflux.  Bowel movements go between constipation and diarrhea. Maternal uncle colon cancer age 52.  Patient denies fever, nausea, vomiting, weight loss, night sweats, melena, hematochezia, hematemesis.    Endoscopy: Review of the patient's most recent EGD and colonoscopy performed by Dr. Ny on 6/6/2016 revealed normal colon, duodenum.  Stomach reveals a few small polyps.  Esophagus with a medium size hiatal hernia grade 2 esophagitis.  Esophageal stricture dilation was performed with a balloon to 18 mm    Result Review :   The following data was reviewed by: Sheyla Reagan NP on 09/02/2021 for             Past Medical History:   Diagnosis Date   • Acid reflux    • Bronchitis    • Chronic rhinitis    • Chronic rhinosinusitis    • Foot cramps    • Fracture    • Headache    • Hearing loss    • High cholesterol    • Hyperlipemia    • Thyroid disorder    • Tinnitus, bilateral        Past Surgical History:   Procedure Laterality Date   • ANUS SURGERY     • APPENDECTOMY     • COLONOSCOPY  2016   • ENDOSCOPY  2011, 2016   • HYSTERECTOMY           Current Outpatient Medications:   •  azelastine (ASTELIN) 0.1 % nasal spray, 2 sprays into the nostril(s) as  "directed by provider 2 (Two) Times a Day. Use in each nostril as directed, Disp: 30 mL, Rfl: 11  •  ergocalciferol (ERGOCALCIFEROL) 1.25 MG (69333 UT) capsule, 50,000 Units Every 14 (Fourteen) Days., Disp: , Rfl:   •  ESTRIOL-PROGESTERONE MICRO TD, Take  by mouth 2 (two) times a day., Disp: , Rfl:   •  fluticasone (Flonase) 50 MCG/ACT nasal spray, 2 sprays into the nostril(s) as directed by provider Daily. Administer 2 sprays in each nostril for each dose., Disp: 16 g, Rfl: 6  •  multivitamin with minerals tablet tablet, Take 1 tablet by mouth Daily., Disp: , Rfl:   •  sodium-potassium-magnesium sulfates (Suprep Bowel Prep Kit) 17.5-3.13-1.6 GM/177ML solution oral solution, Take 1 bottle by mouth Every 12 (Twelve) Hours., Disp: 254 mL, Rfl: 0  •  Thyroid (ARMOUR THYROID) 30 MG PO tablet, [place] Thyroid 30 mg oral tablet take 1 tablet (30 mg) by oral route once daily   Active, Disp: , Rfl:   •  famotidine (Pepcid) 20 MG tablet, Take 1 tablet by mouth every night at bedtime., Disp: 30 tablet, Rfl: 2     No Known Allergies    Family History   Problem Relation Age of Onset   • Hypertension Mother    • Cancer Mother         unspecified   • Breast cancer Mother    • Osteoporosis Mother    • Cancer Father         unspecified   • Prostate cancer Father    • Heart disease Brother    • Colon cancer Maternal Uncle         Malignant,         Social History     Social History Narrative    Lives with spouse       Objective     Vital Signs:   /72 (BP Location: Left arm, Patient Position: Sitting, Cuff Size: Adult)   Pulse 69   Ht 152.4 cm (60\")   Wt 59.7 kg (131 lb 11.2 oz)   SpO2 99%   BMI 25.72 kg/m²     Body mass index is 25.72 kg/m².    Physical Exam  Constitutional:       General: She is not in acute distress.     Appearance: Normal appearance. She is well-developed and normal weight.   Eyes:      Conjunctiva/sclera: Conjunctivae normal.      Pupils: Pupils are equal, round, and reactive to light.      " Visual Fields: Right eye visual fields normal and left eye visual fields normal.   Cardiovascular:      Rate and Rhythm: Normal rate and regular rhythm.      Heart sounds: Normal heart sounds.   Pulmonary:      Effort: Pulmonary effort is normal. No retractions.      Breath sounds: Normal breath sounds and air entry.      Comments: Inspection of chest: normal appearance  Abdominal:      General: Bowel sounds are normal.      Palpations: Abdomen is soft.      Tenderness: There is abdominal tenderness in the epigastric area.      Comments: No appreciable hepatosplenomegaly   Musculoskeletal:      Cervical back: Neck supple.      Right lower leg: No edema.      Left lower leg: No edema.   Lymphadenopathy:      Cervical: No cervical adenopathy.   Skin:     Findings: No lesion.      Comments: Turgor normal   Neurological:      Mental Status: She is alert and oriented to person, place, and time.   Psychiatric:         Mood and Affect: Mood and affect normal.                 Assessment and Plan    Diagnoses and all orders for this visit:    1. Gastroesophageal reflux disease, unspecified whether esophagitis present (Primary)    2. Nausea    3. Nausea and vomiting, intractability of vomiting not specified, unspecified vomiting type    4. Hiatal hernia    5. Family history of colon cancer    6. Altered bowel habits    7. Screening for colon cancer    8. Esophageal stricture    Other orders  -     famotidine (Pepcid) 20 MG tablet; Take 1 tablet by mouth every night at bedtime.  Dispense: 30 tablet; Refill: 2    61-year-old female presenting the office today as a new patient with a history of difficulty swallowing, hiatal hernia, heartburn, esophageal stricture and in need of a screening colonoscopy.  With the patient's nausea, vomiting, hiatal hernia history of esophageal stricture and reflux I have recommended that the patient undergo further evaluation with an EGD and colonoscopy.  I have discussed this procedure in detail  with the patient.  I have discussed the risks, benefits and alternatives.  I have discussed the risk of anesthesia, bleeding and perforation.  Patient understands these risks, benefits and alternatives and wishes to proceed.  I will schedule her at her earliest convenience.  I have prescribed the patient Pepcid to take at night to see if this helps with her epigastric pain nausea and reflux.  We will follow up in the office after endoscopy.  Patient is agreeable to this plan will call with any questions or concerns.              Follow Up   Return for Follow up after endoscopy in office.  Patient was given instructions and counseling regarding her condition or for health maintenance advice. Please see specific information pulled into the AVS if appropriate.

## 2021-09-02 NOTE — PATIENT INSTRUCTIONS
Food Choices for Gastroesophageal Reflux Disease, Adult  When you have gastroesophageal reflux disease (GERD), the foods you eat and your eating habits are very important. Choosing the right foods can help ease your discomfort. Think about working with a food expert (dietitian) to help you make good choices.  What are tips for following this plan?  Reading food labels  · Read the label for foods that are low in saturated fat. Foods that may help with your symptoms include:  ? Foods that have less than 5% of daily value (DV) of fat.  ? Foods that have 0 grams of trans fat.  Cooking  · Do not redmond your food. Cook your food by baking, steaming, grilling, or broiling. These are all methods that do not need a lot of fat for cooking.  · To add flavor, try to use herbs that are low in spice and acidity.  Meal planning    · Choose healthy foods that are low in fat, such as:  ? Fruits and vegetables.  ? Whole grains.  ? Low-fat dairy products.  ? Lean meats, fish, and poultry.  · Eat small meals often instead of eating 3 large meals each day. Eat your meals slowly in a place where you are relaxed. Avoid bending over or lying down until 2-3 hours after eating.  · Limit high-fat foods such as fatty meats or fried foods.  · Limit your intake of oils, butter, and shortening to less than 8 teaspoons each day.  · Avoid the following:  ? Foods that cause symptoms. These may be different for different people. Keep a food diary to keep track of foods that cause symptoms.  ? Alcohol.  ? Drinking a lot of liquid with meals.  ? Eating meals during the 2-3 hours before bed.  Lifestyle  · Stay at a healthy weight. Ask your doctor what weight is healthy for you. If you need to lose weight, work with your doctor to do so safely.  · Exercise for at least 30 minutes on 5 or more days each week, or as told by your doctor.  · Wear loose-fitting clothes.  · Do not use any products that contain nicotine or tobacco, such as cigarettes,  e-cigarettes, and chewing tobacco. If you need help quitting, ask your doctor.  · Sleep with the head of your bed higher than your feet. Use a wedge under the mattress or blocks under the bed frame to raise the head of the bed.  What foods should eat?    Eat a healthy, well-balanced diet of fruits, vegetables, whole grains, low-fat dairy products, lean meats, fish, and poultry. Each person is different. Foods that may cause symptoms in one person may not cause any symptoms in another person. Work with your doctor to find foods that are safe for you.  The items listed above may not be a complete list of foods and beverages you can eat. Contact a dietitian for more information.  What foods should I avoid?  Limiting some of these foods may help in managing the symptoms of GERD. Everyone is different. Talk with a food expert or your doctor to help you find the exact foods to avoid, if any.  Fruits  Any fruits prepared with added fat. Any fruits that cause symptoms. For some people, this may include citrus fruits, such as oranges, grapefruit, pineapple, and clif.  Vegetables  Deep-fried vegetables. French fries. Any vegetables prepared with added fat. Any vegetables that cause symptoms. For some people, this may include tomatoes and tomato products, chili peppers, onions and garlic, and horseradish.  Grains  Pastries or quick breads with added fat.  Meats and other proteins  High-fat meats, such as fatty beef or pork, hot dogs, ribs, ham, sausage, salami, and shearer. Fried meat or protein, including fried fish and fried chicken. Nuts and nut butters.  Dairy  Whole milk and chocolate milk. Sour cream. Cream. Ice cream. Cream cheese. Milkshakes.  Fats and oils  Butter. Margarine. Shortening. Ghee.  Beverages  Coffee and tea, with or without caffeine. Carbonated beverages. Sodas. Energy drinks. Fruit juice made with acidic fruits (such as orange or grapefruit). Tomato juice. Alcoholic drinks.  Sweets and  desserts  Chocolate and cocoa. Donuts.  Seasonings and condiments  Pepper. Peppermint and spearmint. Added salt. Any condiments, herbs, or seasonings that cause symptoms. For some people, this may include boyer, hot sauce, or vinegar-based salad dressings.  The items listed above may not be a complete list of foods and beverages you should avoid. Contact a dietitian for more information.  Questions to ask your doctor  Diet and lifestyle changes are often the first steps that are taken to manage symptoms of GERD. If diet and lifestyle changes do not help, talk with your doctor about taking medicines.  Where to find more information  · International Foundation for Gastrointestinal Disorders: aboutgerd.org  Summary  · When you have GERD, food and lifestyle choices are very important in easing your symptoms.  · Eat small meals often instead of 3 large meals a day. Eat your meals slowly and in a place where you are relaxed.  · Avoid bending over or lying down until 2-3 hours after eating.  · Limit high-fat foods such as fatty meat or fried foods.  This information is not intended to replace advice given to you by your health care provider. Make sure you discuss any questions you have with your health care provider.  Document Revised: 10/12/2020 Document Reviewed: 10/12/2020  Elsevier Patient Education © 2021 Elsevier Inc.

## 2021-09-03 ENCOUNTER — PATIENT ROUNDING (BHMG ONLY) (OUTPATIENT)
Dept: GASTROENTEROLOGY | Facility: CLINIC | Age: 61
End: 2021-09-03

## 2021-09-03 NOTE — PROGRESS NOTES
September 3, 2021    Hello, may I speak with Leeanna Jackson?    My name is Estela, Practice Manager for [facility]. I'm calling about your recent visit with RONALDO Eldridge.      I am  with [facility]  [address] [place] [address] [phone].    Before we get started may I verify your date of birth? 1960    I am calling to officially welcome you to our practice and ask about your recent visit. Is this a good time to talk? No-unable to leave VM    Tell me about your visit with us. What things went well?  Unable to leave VM       We're always looking for ways to make our patients' experiences even better. Do you have recommendations on ways we may improve?  N/A    Overall were you satisfied with your first visit to our practice? N/A       I appreciate you taking the time to speak with me today. Is there anything else I can do for you? N/A        Thank you, and have a great day.

## 2021-09-15 ENCOUNTER — TELEPHONE (OUTPATIENT)
Dept: GASTROENTEROLOGY | Facility: CLINIC | Age: 61
End: 2021-09-15

## 2021-09-15 NOTE — TELEPHONE ENCOUNTER
Due to covid, patient procedure will be cancelled and added to a list and will be rescheduled at a later time. Patient informed of this information.

## 2022-01-12 ENCOUNTER — LAB (OUTPATIENT)
Dept: LAB | Facility: HOSPITAL | Age: 62
End: 2022-01-12

## 2022-01-12 DIAGNOSIS — Z12.11 SCREENING FOR COLON CANCER: ICD-10-CM

## 2022-01-12 DIAGNOSIS — R19.4 ALTERED BOWEL HABITS: ICD-10-CM

## 2022-01-12 DIAGNOSIS — K44.9 HIATAL HERNIA: ICD-10-CM

## 2022-01-12 DIAGNOSIS — R11.2 NAUSEA AND VOMITING, INTRACTABILITY OF VOMITING NOT SPECIFIED, UNSPECIFIED VOMITING TYPE: ICD-10-CM

## 2022-01-12 DIAGNOSIS — K21.9 GASTROESOPHAGEAL REFLUX DISEASE, UNSPECIFIED WHETHER ESOPHAGITIS PRESENT: ICD-10-CM

## 2022-01-12 DIAGNOSIS — Z80.0 FAMILY HISTORY OF COLON CANCER: ICD-10-CM

## 2022-01-12 DIAGNOSIS — R11.0 NAUSEA: ICD-10-CM

## 2022-01-12 DIAGNOSIS — K22.2 ESOPHAGEAL STRICTURE: ICD-10-CM

## 2022-01-12 PROCEDURE — U0004 COV-19 TEST NON-CDC HGH THRU: HCPCS

## 2022-01-13 LAB — SARS-COV-2 RNA PNL SPEC NAA+PROBE: NOT DETECTED

## 2022-05-04 DIAGNOSIS — K21.9 GASTROESOPHAGEAL REFLUX DISEASE, UNSPECIFIED WHETHER ESOPHAGITIS PRESENT: Primary | ICD-10-CM

## 2022-05-04 DIAGNOSIS — Z80.0 FAMILY HISTORY OF COLON CANCER: ICD-10-CM

## 2022-05-04 DIAGNOSIS — Z12.11 SCREENING FOR COLON CANCER: ICD-10-CM

## 2022-05-04 DIAGNOSIS — K22.2 ESOPHAGEAL STRICTURE: ICD-10-CM

## 2022-05-04 DIAGNOSIS — R11.0 NAUSEA: ICD-10-CM

## 2022-05-04 DIAGNOSIS — R19.4 ALTERED BOWEL HABITS: ICD-10-CM

## 2022-05-04 DIAGNOSIS — K44.9 HIATAL HERNIA: ICD-10-CM

## 2022-07-20 ENCOUNTER — LAB (OUTPATIENT)
Dept: LAB | Facility: HOSPITAL | Age: 62
End: 2022-07-20

## 2022-07-20 DIAGNOSIS — Z12.11 SCREENING FOR COLON CANCER: ICD-10-CM

## 2022-07-20 DIAGNOSIS — Z80.0 FAMILY HISTORY OF COLON CANCER: ICD-10-CM

## 2022-07-20 DIAGNOSIS — K22.2 ESOPHAGEAL STRICTURE: ICD-10-CM

## 2022-07-20 DIAGNOSIS — K21.9 GASTROESOPHAGEAL REFLUX DISEASE, UNSPECIFIED WHETHER ESOPHAGITIS PRESENT: ICD-10-CM

## 2022-07-20 DIAGNOSIS — K44.9 HIATAL HERNIA: ICD-10-CM

## 2022-07-20 DIAGNOSIS — R11.0 NAUSEA: ICD-10-CM

## 2022-07-20 DIAGNOSIS — R19.4 ALTERED BOWEL HABITS: ICD-10-CM

## 2022-07-20 PROCEDURE — U0004 COV-19 TEST NON-CDC HGH THRU: HCPCS

## 2022-07-21 LAB — SARS-COV-2 RNA PNL SPEC NAA+PROBE: NOT DETECTED

## 2022-07-25 ENCOUNTER — ANESTHESIA EVENT (OUTPATIENT)
Dept: GASTROENTEROLOGY | Facility: HOSPITAL | Age: 62
End: 2022-07-25

## 2022-07-25 ENCOUNTER — ANESTHESIA (OUTPATIENT)
Dept: GASTROENTEROLOGY | Facility: HOSPITAL | Age: 62
End: 2022-07-25

## 2022-07-25 ENCOUNTER — HOSPITAL ENCOUNTER (OUTPATIENT)
Facility: HOSPITAL | Age: 62
Setting detail: HOSPITAL OUTPATIENT SURGERY
Discharge: HOME | End: 2022-07-25
Attending: INTERNAL MEDICINE | Admitting: INTERNAL MEDICINE

## 2022-07-25 VITALS
OXYGEN SATURATION: 99 % | RESPIRATION RATE: 14 BRPM | WEIGHT: 138.01 LBS | DIASTOLIC BLOOD PRESSURE: 79 MMHG | HEART RATE: 71 BPM | BODY MASS INDEX: 27.09 KG/M2 | HEIGHT: 60 IN | SYSTOLIC BLOOD PRESSURE: 115 MMHG | TEMPERATURE: 97.9 F

## 2022-07-25 DIAGNOSIS — K21.9 GASTROESOPHAGEAL REFLUX DISEASE WITHOUT ESOPHAGITIS: Primary | ICD-10-CM

## 2022-07-25 DIAGNOSIS — Z86.010 HISTORY OF COLON POLYPS: ICD-10-CM

## 2022-07-25 PROCEDURE — 88305 TISSUE EXAM BY PATHOLOGIST: CPT | Performed by: INTERNAL MEDICINE

## 2022-07-25 PROCEDURE — G0105 COLORECTAL SCRN; HI RISK IND: HCPCS | Performed by: INTERNAL MEDICINE

## 2022-07-25 PROCEDURE — 43239 EGD BIOPSY SINGLE/MULTIPLE: CPT | Performed by: INTERNAL MEDICINE

## 2022-07-25 PROCEDURE — 25010000002 PROPOFOL 10 MG/ML EMULSION: Performed by: NURSE ANESTHETIST, CERTIFIED REGISTERED

## 2022-07-25 RX ORDER — PROPOFOL 10 MG/ML
VIAL (ML) INTRAVENOUS CONTINUOUS PRN
Status: DISCONTINUED | OUTPATIENT
Start: 2022-07-25 | End: 2022-07-25 | Stop reason: SURG

## 2022-07-25 RX ORDER — SODIUM CHLORIDE, SODIUM LACTATE, POTASSIUM CHLORIDE, CALCIUM CHLORIDE 600; 310; 30; 20 MG/100ML; MG/100ML; MG/100ML; MG/100ML
30 INJECTION, SOLUTION INTRAVENOUS CONTINUOUS
Status: DISCONTINUED | OUTPATIENT
Start: 2022-07-25 | End: 2022-07-25 | Stop reason: HOSPADM

## 2022-07-25 RX ORDER — LIDOCAINE HYDROCHLORIDE 20 MG/ML
INJECTION, SOLUTION EPIDURAL; INFILTRATION; INTRACAUDAL; PERINEURAL AS NEEDED
Status: DISCONTINUED | OUTPATIENT
Start: 2022-07-25 | End: 2022-07-25 | Stop reason: SURG

## 2022-07-25 RX ADMIN — SODIUM CHLORIDE, POTASSIUM CHLORIDE, SODIUM LACTATE AND CALCIUM CHLORIDE 30 ML/HR: 600; 310; 30; 20 INJECTION, SOLUTION INTRAVENOUS at 08:57

## 2022-07-25 RX ADMIN — PROPOFOL 200 MCG/KG/MIN: 10 INJECTION, EMULSION INTRAVENOUS at 11:03

## 2022-07-25 RX ADMIN — LIDOCAINE HYDROCHLORIDE 50 MG: 20 INJECTION, SOLUTION EPIDURAL; INFILTRATION; INTRACAUDAL; PERINEURAL at 11:00

## 2022-07-25 RX ADMIN — PROPOFOL 250 MCG/KG/MIN: 10 INJECTION, EMULSION INTRAVENOUS at 10:58

## 2022-07-25 NOTE — ANESTHESIA POSTPROCEDURE EVALUATION
Patient: Leeanna Jackson    Procedure Summary     Date: 07/25/22 Room / Location: [facility] ENDOSCOPY 2 / [facility] ENDOSCOPY    Anesthesia Start: 1055 Anesthesia Stop: 1144    Procedures:       ESOPHAGOGASTRODUODENOSCOPY WITH BIOPSIES (N/A )      COLONOSCOPY (N/A ) Diagnosis:       History of colon polyps      (History of colon polyps [Z86.010])    Surgeons: Jonatan Ny MD Provider: Gabriele Toledo MD    Anesthesia Type: general ASA Status: 2          Anesthesia Type: general    Vitals  Vitals Value Taken Time   /79 07/25/22 1158   Temp 36.6 °C (97.9 °F) 07/25/22 1158   Pulse 71 07/25/22 1158   Resp 14 07/25/22 1158   SpO2 99 % 07/25/22 1158           Post Anesthesia Care and Evaluation    Patient location during evaluation: bedside  Patient participation: complete - patient participated  Level of consciousness: awake  Pain management: adequate    Airway patency: patent  Anesthetic complications: No anesthetic complications  PONV Status: none  Cardiovascular status: acceptable  Respiratory status: acceptable  Hydration status: acceptable    Comments: An Anesthesiologist personally participated in the most demanding procedures (including induction and emergence if applicable) in the anesthesia plan, monitored the course of anesthesia administration at frequent intervals and remained physically present and available for immediate diagnosis and treatment of emergencies.

## 2022-07-25 NOTE — ANESTHESIA PREPROCEDURE EVALUATION
Anesthesia Evaluation     Patient summary reviewed and Nursing notes reviewed                Airway   Mallampati: I  TM distance: >3 FB  Neck ROM: full  No difficulty expected  Dental      Pulmonary - negative pulmonary ROS and normal exam    breath sounds clear to auscultation  Cardiovascular - normal exam    Rhythm: regular  Rate: normal    (+) hyperlipidemia,       Neuro/Psych  (+) headaches,    GI/Hepatic/Renal/Endo    (+)  GERD,  thyroid problem     Musculoskeletal (-) negative ROS    Abdominal    Substance History - negative use     OB/GYN negative ob/gyn ROS         Other                        Anesthesia Plan    ASA 2     general     intravenous induction     Anesthetic plan, risks, benefits, and alternatives have been provided, discussed and informed consent has been obtained with: patient.        CODE STATUS:

## 2022-07-26 ENCOUNTER — TELEPHONE (OUTPATIENT)
Dept: GASTROENTEROLOGY | Facility: CLINIC | Age: 62
End: 2022-07-26

## 2022-07-26 LAB
CYTO UR: NORMAL
LAB AP CASE REPORT: NORMAL
LAB AP CLINICAL INFORMATION: NORMAL
PATH REPORT.FINAL DX SPEC: NORMAL
PATH REPORT.GROSS SPEC: NORMAL

## 2022-07-26 NOTE — TELEPHONE ENCOUNTER
Ms Jackson called stating she had egd/colon yesterday. Was unsure about the PPI dr rivera had wanted her to day.    Per report, prilosec 40mg BID x 1m, then QD x 2m.. pepcid at bedtime..    Ms Jackson stated she got OTC nexium instead.. OK to take per Sheyla Reagan NP..    Ms Faulkner voiced understanding. Encouraged to keep f/u appt in sept. gaylen

## 2022-07-28 ENCOUNTER — TELEPHONE (OUTPATIENT)
Dept: GASTROENTEROLOGY | Facility: CLINIC | Age: 62
End: 2022-07-28

## 2022-07-28 NOTE — TELEPHONE ENCOUNTER
Spoke to pt and informed of Sheyla HIGGINS result note and recommendations. Pt has established f/u with Juana Price RONALDO on 09/16/2022. Pt states that she restarted on medication for reflux has directed by Dr. Ny on day of procedure. Stating that her reflux has improved. [facility] Wang's [facility]. Educated pt to contact office or utilized [employer] to inform if worsening or no improvement of symptoms. Pt verified understanding.    1 year EGD recall in place.  5 year Colon recall in place.

## 2022-07-28 NOTE — TELEPHONE ENCOUNTER
----- Message from RONALDO Eldridge sent at 7/26/2022  2:49 PM EDT -----  Wang's esophagus noted on GE junction biopsy.  Recall for EGD in 1 year.    I have reviewed the patient colonoscopy and pathology report.  It is recommended the patient be on a 5 year recall. Please place in the recall system.

## 2022-09-15 NOTE — PROGRESS NOTES
Chief Complaint  EGD follow up  Leeanna Jackson is a 62 y.o. female who presents to [facility] [place] for EGD follow up    History of present Illness  Patient presents to the office for EGD follow up.  Patient is currently taking Prilosec 20 mg and Pepcid 20 mg daily.  With this regimen she denies heartburn, epigastric pain, nausea, vomiting, and dysphagia.  Overall patient feels that GI symptoms have resolved.  Denies lower GI symptoms such as change in bowel habits, melena, and hematochezia.    EGD/colonoscopy 7/25/2022 by Dr. Ny - 5cm hiatal hernia, LA Grade C esophagitis, multple gastric polyps, and normal duodenum.  GE junction biopsies show intestinal metaplasia, patient in 1 year EGD recall.  Diverticula in the sigmoid colon otherwise normal mucosa throughout.  Repeat colonoscopy in 5 years.      Past Medical History:   Diagnosis Date   • Acid reflux    • Bronchitis    • Chronic rhinitis    • Chronic rhinosinusitis    • Foot cramps    • Fracture    • Headache    • Hearing loss    • High cholesterol    • Hyperlipemia    • Thyroid disorder    • Tinnitus, bilateral        Past Surgical History:   Procedure Laterality Date   • ANUS SURGERY     • APPENDECTOMY     • COLONOSCOPY  2016   • COLONOSCOPY N/A 07/25/2022    Procedure: COLONOSCOPY;  Surgeon: Jonatan Ny MD;  Location: [facility] ENDOSCOPY;  Service: Gastroenterology;  Laterality: N/A;  DIVERTICULOSIS   • ENDOSCOPY  2011, 2016   • ENDOSCOPY N/A 07/25/2022    Procedure: ESOPHAGOGASTRODUODENOSCOPY WITH BIOPSIES;  Surgeon: Jonatan Ny MD;  Location: [facility] ENDOSCOPY;  Service: Gastroenterology;  Laterality: N/A;  ESOPHAGITIS, HIATAL HERNIA, FUNDIC GLAND POLYPS   • HYSTERECTOMY     • UPPER GASTROINTESTINAL ENDOSCOPY           Current Outpatient Medications:   •  ESTRIOL-PROGESTERONE MICRO TD, Take 1 tablet by mouth 2 (two) times a day., Disp: , Rfl:   •  famotidine (PEPCID) 10 MG tablet, Take 20 mg by  "mouth 2 (Two) Times a Day., Disp: , Rfl:   •  multivitamin with minerals tablet tablet, Take 1 tablet by mouth Daily., Disp: , Rfl:   •  nitrofurantoin, macrocrystal-monohydrate, (MACROBID) 100 MG capsule, Take 100 mg by mouth 2 (Two) Times a Day., Disp: , Rfl:   •  omeprazole (priLOSEC) 20 MG capsule, Take 20 mg by mouth Daily., Disp: , Rfl:   •  Thyroid 30 MG PO tablet, Take 30 mg by mouth Daily., Disp: , Rfl:   •  ergocalciferol (ERGOCALCIFEROL) 1.25 MG (64148 UT) capsule, Take 50,000 Units by mouth Every 14 (Fourteen) Days., Disp: , Rfl:   •  famotidine (Pepcid) 20 MG tablet, Take 1 tablet by mouth every night at bedtime. (Patient taking differently: Take 20 mg by mouth every night at bedtime. Not taking), Disp: 30 tablet, Rfl: 2     No Known Allergies    Family History   Problem Relation Age of Onset   • Hypertension Mother    • Cancer Mother         unspecified   • Breast cancer Mother    • Osteoporosis Mother    • Cancer Father         unspecified   • Prostate cancer Father    • Heart disease Brother    • Colon cancer Maternal Uncle 50        Malignant,    • Malig Hyperthermia Neg Hx         Social History     Social History Narrative    Lives with spouse       Objective       Vital Signs:   /86 (BP Location: Left arm, Patient Position: Sitting, Cuff Size: Adult)   Pulse 72   Ht 152.4 cm (60\")   Wt 64.5 kg (142 lb 3.2 oz)   SpO2 100%   BMI 27.77 kg/m²       Physical Exam  Constitutional:       Appearance: Normal appearance.   HENT:      Head: Normocephalic.   Cardiovascular:      Rate and Rhythm: Normal rate and regular rhythm.      Heart sounds: Normal heart sounds.   Pulmonary:      Effort: Pulmonary effort is normal.      Breath sounds: Normal breath sounds.   Abdominal:      General: Bowel sounds are normal.      Palpations: Abdomen is soft.   Skin:     General: Skin is warm and dry.   Neurological:      Mental Status: She is alert and oriented to person, place, and time. Mental status " is at baseline.   Psychiatric:         Mood and Affect: Mood normal.         Behavior: Behavior normal.         Thought Content: Thought content normal.         Judgment: Judgment normal.         Result Review :                   Assessment and Plan    Diagnoses and all orders for this visit:    1. Wang's esophagus without dysplasia (Primary)    2. Gastroesophageal reflux disease with esophagitis without hemorrhage    3. Diverticulosis    62-year-old patient presents to the office for endoscopy follow-up.  I reviewed most recent endoscopy reports and pathology with the patient.  Patient is in 1 year recall for EGD due to Wang's esophagus.  Patient is in 5-year recall for colonoscopy.  Patient's heartburn and epigastric pain have resolved with Prilosec 20 mg and Pepcid 20 mg daily.  Patient will wean Prilosec or Pepcid as tolerated.  Advised patient to remain on 1 reflux medication due to history of Wang's and hiatal hernia.  Patient is agreeable to plan will follow-up in 1 year.    Follow Up   No follow-ups on file.  Patient was given instructions and counseling regarding her condition or for health maintenance advice. Please see specific information pulled into the AVS if appropriate.

## 2022-09-16 ENCOUNTER — OFFICE VISIT (OUTPATIENT)
Dept: GASTROENTEROLOGY | Facility: CLINIC | Age: 62
End: 2022-09-16

## 2022-09-16 VITALS
BODY MASS INDEX: 27.92 KG/M2 | SYSTOLIC BLOOD PRESSURE: 140 MMHG | HEART RATE: 72 BPM | HEIGHT: 60 IN | DIASTOLIC BLOOD PRESSURE: 86 MMHG | OXYGEN SATURATION: 100 % | WEIGHT: 142.2 LBS

## 2022-09-16 DIAGNOSIS — K21.00 GASTROESOPHAGEAL REFLUX DISEASE WITH ESOPHAGITIS WITHOUT HEMORRHAGE: ICD-10-CM

## 2022-09-16 DIAGNOSIS — K22.70 BARRETT'S ESOPHAGUS WITHOUT DYSPLASIA: Primary | ICD-10-CM

## 2022-09-16 DIAGNOSIS — K57.90 DIVERTICULOSIS: ICD-10-CM

## 2022-09-16 PROCEDURE — 99213 OFFICE O/P EST LOW 20 MIN: CPT

## 2022-09-16 RX ORDER — OMEPRAZOLE 20 MG/1
20 CAPSULE, DELAYED RELEASE ORAL DAILY
COMMUNITY

## 2022-09-16 RX ORDER — FAMOTIDINE 10 MG
20 TABLET ORAL 2 TIMES DAILY
COMMUNITY

## 2022-09-16 RX ORDER — NITROFURANTOIN 25; 75 MG/1; MG/1
100 CAPSULE ORAL 2 TIMES DAILY
COMMUNITY

## 2023-04-21 ENCOUNTER — TRANSCRIBE ORDERS (OUTPATIENT)
Dept: ADMINISTRATIVE | Facility: HOSPITAL | Age: 63
End: 2023-04-21
Payer: OTHER GOVERNMENT

## 2023-04-21 DIAGNOSIS — N63.10 MASS OF RIGHT BREAST, UNSPECIFIED QUADRANT: Primary | ICD-10-CM

## 2023-05-08 ENCOUNTER — HOSPITAL ENCOUNTER (OUTPATIENT)
Dept: ULTRASOUND IMAGING | Facility: HOSPITAL | Age: 63
Discharge: HOME | End: 2023-05-08
Payer: OTHER GOVERNMENT

## 2023-05-08 ENCOUNTER — HOSPITAL ENCOUNTER (OUTPATIENT)
Dept: MAMMOGRAPHY | Facility: HOSPITAL | Age: 63
Discharge: HOME | End: 2023-05-08
Payer: OTHER GOVERNMENT

## 2023-05-08 DIAGNOSIS — N63.10 MASS OF RIGHT BREAST, UNSPECIFIED QUADRANT: ICD-10-CM

## 2023-05-08 PROCEDURE — 76642 ULTRASOUND BREAST LIMITED: CPT

## 2023-05-08 PROCEDURE — 77066 DX MAMMO INCL CAD BI: CPT

## 2023-05-08 PROCEDURE — G0279 TOMOSYNTHESIS, MAMMO: HCPCS

## 2023-07-12 PROBLEM — R06.6 HICCUPS: Status: ACTIVE | Noted: 2023-07-12

## 2023-07-12 PROBLEM — K21.00 GASTROESOPHAGEAL REFLUX DISEASE WITH ESOPHAGITIS WITHOUT HEMORRHAGE: Status: ACTIVE | Noted: 2023-07-12

## 2023-07-12 PROBLEM — K22.70 BARRETT'S ESOPHAGUS WITHOUT DYSPLASIA: Status: ACTIVE | Noted: 2023-07-12

## 2023-07-12 PROBLEM — R11.10 VOMITING: Status: ACTIVE | Noted: 2023-07-12

## 2023-08-14 ENCOUNTER — HOSPITAL ENCOUNTER (OUTPATIENT)
Facility: HOSPITAL | Age: 63
Setting detail: HOSPITAL OUTPATIENT SURGERY
Discharge: HOME | End: 2023-08-14
Attending: INTERNAL MEDICINE | Admitting: INTERNAL MEDICINE
Payer: OTHER GOVERNMENT

## 2023-08-14 ENCOUNTER — ANESTHESIA EVENT (OUTPATIENT)
Dept: GASTROENTEROLOGY | Facility: HOSPITAL | Age: 63
End: 2023-08-14
Payer: OTHER GOVERNMENT

## 2023-08-14 ENCOUNTER — ANESTHESIA (OUTPATIENT)
Dept: GASTROENTEROLOGY | Facility: HOSPITAL | Age: 63
End: 2023-08-14
Payer: OTHER GOVERNMENT

## 2023-08-14 VITALS
HEART RATE: 63 BPM | SYSTOLIC BLOOD PRESSURE: 126 MMHG | TEMPERATURE: 97 F | RESPIRATION RATE: 17 BRPM | WEIGHT: 139.77 LBS | BODY MASS INDEX: 27.3 KG/M2 | OXYGEN SATURATION: 96 % | DIASTOLIC BLOOD PRESSURE: 77 MMHG

## 2023-08-14 DIAGNOSIS — K22.70 BARRETT'S ESOPHAGUS WITHOUT DYSPLASIA: ICD-10-CM

## 2023-08-14 DIAGNOSIS — K21.00 GASTROESOPHAGEAL REFLUX DISEASE WITH ESOPHAGITIS WITHOUT HEMORRHAGE: ICD-10-CM

## 2023-08-14 DIAGNOSIS — R06.6 HICCUPS: ICD-10-CM

## 2023-08-14 DIAGNOSIS — R11.10 VOMITING, UNSPECIFIED VOMITING TYPE, UNSPECIFIED WHETHER NAUSEA PRESENT: ICD-10-CM

## 2023-08-14 PROCEDURE — 25010000002 PROPOFOL 10 MG/ML EMULSION: Performed by: NURSE ANESTHETIST, CERTIFIED REGISTERED

## 2023-08-14 PROCEDURE — 88305 TISSUE EXAM BY PATHOLOGIST: CPT | Performed by: INTERNAL MEDICINE

## 2023-08-14 RX ORDER — PROPOFOL 10 MG/ML
VIAL (ML) INTRAVENOUS AS NEEDED
Status: DISCONTINUED | OUTPATIENT
Start: 2023-08-14 | End: 2023-08-14 | Stop reason: SURG

## 2023-08-14 RX ORDER — SODIUM CHLORIDE, SODIUM LACTATE, POTASSIUM CHLORIDE, CALCIUM CHLORIDE 600; 310; 30; 20 MG/100ML; MG/100ML; MG/100ML; MG/100ML
30 INJECTION, SOLUTION INTRAVENOUS CONTINUOUS
Status: DISCONTINUED | OUTPATIENT
Start: 2023-08-14 | End: 2023-08-14 | Stop reason: HOSPADM

## 2023-08-14 RX ORDER — OMEPRAZOLE 40 MG/1
40 CAPSULE, DELAYED RELEASE ORAL DAILY
COMMUNITY

## 2023-08-14 RX ORDER — LIDOCAINE HYDROCHLORIDE 20 MG/ML
INJECTION, SOLUTION EPIDURAL; INFILTRATION; INTRACAUDAL; PERINEURAL AS NEEDED
Status: DISCONTINUED | OUTPATIENT
Start: 2023-08-14 | End: 2023-08-14 | Stop reason: SURG

## 2023-08-14 RX ADMIN — PROPOFOL 100 MG: 10 INJECTION, EMULSION INTRAVENOUS at 09:34

## 2023-08-14 RX ADMIN — SODIUM CHLORIDE, POTASSIUM CHLORIDE, SODIUM LACTATE AND CALCIUM CHLORIDE 30 ML/HR: 600; 310; 30; 20 INJECTION, SOLUTION INTRAVENOUS at 08:51

## 2023-08-14 RX ADMIN — PROPOFOL 20 MG: 10 INJECTION, EMULSION INTRAVENOUS at 09:40

## 2023-08-14 RX ADMIN — LIDOCAINE HYDROCHLORIDE 80 MG: 20 INJECTION, SOLUTION EPIDURAL; INFILTRATION; INTRACAUDAL; PERINEURAL at 09:33

## 2023-08-14 RX ADMIN — PROPOFOL 20 MG: 10 INJECTION, EMULSION INTRAVENOUS at 09:37

## 2023-08-14 NOTE — ANESTHESIA POSTPROCEDURE EVALUATION
Patient: Leeanna Jackson    Procedure Summary       Date: 08/14/23 Room / Location: [facility] ENDOSCOPY 2 / [facility] ENDOSCOPY    Anesthesia Start: 0931 Anesthesia Stop: 0946    Procedure: ESOPHAGOGASTRODUODENOSCOPY with biopsies Diagnosis:       Wang's esophagus without dysplasia      Gastroesophageal reflux disease with esophagitis without hemorrhage      Hiccups      Vomiting, unspecified vomiting type, unspecified whether nausea present      (Wang's esophagus without dysplasia [K22.70])      (Gastroesophageal reflux disease with esophagitis without hemorrhage [K21.00])      (Hiccups [R06.6])      (Vomiting, unspecified vomiting type, unspecified whether nausea present [R11.10])    Surgeons: Jonatan Ny MD Provider: Derrick Kumar MD    Anesthesia Type: general ASA Status: 3            Anesthesia Type: general    Vitals  Vitals Value Taken Time   /77 08/14/23 1000   Temp 36.1 øC (97 øF) 08/14/23 1000   Pulse 81 08/14/23 1003   Resp 17 08/14/23 1000   SpO2 95 % 08/14/23 1003   Vitals shown include unvalidated device data.        Post Anesthesia Care and Evaluation    Patient location during evaluation: bedside  Patient participation: complete - patient participated  Level of consciousness: awake  Pain management: adequate    Airway patency: patent  PONV Status: none  Cardiovascular status: acceptable and stable  Respiratory status: acceptable  Hydration status: acceptable    Comments: An Anesthesiologist personally participated in the most demanding procedures (including induction and emergence if applicable) in the anesthesia plan, monitored the course of anesthesia administration at frequent intervals and remained physically present and available for immediate diagnosis and treatment of emergencies.

## 2023-08-14 NOTE — H&P
Pre Procedure History & Physical    Chief Complaint:   Gerd  britton's    Subjective     HPI:   As above    Past Medical History:   Past Medical History:   Diagnosis Date    Acid reflux     Britton's esophagus     Bronchitis     Chronic rhinitis     Chronic rhinosinusitis     Foot cramps     Fracture     Headache     Hearing loss     Hiatal hernia     High cholesterol     Hyperlipemia     Hypertension     Thyroid disorder     Tinnitus, bilateral        Past Surgical History:  Past Surgical History:   Procedure Laterality Date    ANUS SURGERY      SPHINCTER REPAIR    APPENDECTOMY      COLONOSCOPY      COLONOSCOPY N/A 2022    Procedure: COLONOSCOPY;  Surgeon: Jonatan Ny MD;  Location: [facility] ENDOSCOPY;  Service: Gastroenterology;  Laterality: N/A;  DIVERTICULOSIS    ENDOSCOPY  2016    ENDOSCOPY N/A 2022    Procedure: ESOPHAGOGASTRODUODENOSCOPY WITH BIOPSIES;  Surgeon: Jonatan Ny MD;  Location: [facility] ENDOSCOPY;  Service: Gastroenterology;  Laterality: N/A;  ESOPHAGITIS, HIATAL HERNIA, FUNDIC GLAND POLYPS    HYSTERECTOMY      UPPER GASTROINTESTINAL ENDOSCOPY         Family History:  Family History   Problem Relation Age of Onset    Hypertension Mother     Cancer Mother         unspecified    Breast cancer Mother     Osteoporosis Mother     Cancer Father         unspecified    Prostate cancer Father     Heart disease Brother     Colon cancer Maternal Uncle 50        Malignant,     Malig Hyperthermia Neg Hx        Social History:   reports that she has never smoked. She has never used smokeless tobacco. She reports that she does not drink alcohol and does not use drugs.    Medications:   Medications Prior to Admission   Medication Sig Dispense Refill Last Dose    ergocalciferol (ERGOCALCIFEROL) 1.25 MG (90089 UT) capsule Take 1 capsule by mouth Every 14 (Fourteen) Days.   Past Month    famotidine (Pepcid) 20 MG tablet Take 1 tablet by mouth every night at bedtime.  30 tablet 2 8/13/2023    NON FORMULARY 2 (Two) Times a Day. PROGESTERONE, TESTOSTERONE, ESTROGEN STEPHEN   8/14/2023    omeprazole (priLOSEC) 40 MG capsule Take 1 capsule by mouth Daily.   8/14/2023    Thyroid 30 MG PO tablet Take 1 tablet by mouth Daily. PATIENT REPORTS THAT SHE CAN ONLY TAKE BRAND NAME SARAH THYROID. THE GENERIC FORM CAUSES HTN, SOA, PALPITATIONS.   8/14/2023    multivitamin with minerals tablet tablet Take 1 tablet by mouth Daily.   More than a month       Allergies:  Other        Objective     Blood pressure 147/79, pulse 68, temperature 97.4 øF (36.3 øC), temperature source Temporal, resp. rate 16, weight 63.4 kg (139 lb 12.4 oz), SpO2 99 %.    Physical Exam   Constitutional: Pt is oriented to person, place, and time and well-developed, well-nourished, and in no distress.   Mouth/Throat: Oropharynx is clear and moist.   Neck: Normal range of motion.   Cardiovascular: Normal rate, regular rhythm and normal heart sounds.    Pulmonary/Chest: Effort normal and breath sounds normal.   Abdominal: Soft. Nontender  Skin: Skin is warm and dry.   Psychiatric: Mood, memory, affect and judgment normal.     Assessment & Plan     Diagnosis:  Gerd  britton's    Anticipated Surgical Procedure:  egd    The risks, benefits, and alternatives of this procedure have been discussed with the patient or the responsible party- the patient understands and agrees to proceed.

## 2023-08-14 NOTE — ANESTHESIA PREPROCEDURE EVALUATION
Anesthesia Evaluation     Patient summary reviewed and Nursing notes reviewed   no history of anesthetic complications:   NPO Solid Status: > 8 hours  NPO Liquid Status: > 2 hours           Airway   Mallampati: II  TM distance: >3 FB  Neck ROM: full  No difficulty expected  Dental      Pulmonary - negative pulmonary ROS and normal exam    breath sounds clear to auscultation  Cardiovascular - negative cardio ROS and normal exam  Exercise tolerance: good (4-7 METS)    Rhythm: regular  Rate: normal        Neuro/Psych- negative ROS  GI/Hepatic/Renal/Endo    (+) GERD well controlled, thyroid problem     Musculoskeletal (-) negative ROS    Abdominal    Substance History - negative use     OB/GYN negative ob/gyn ROS         Other - negative ROS       ROS/Med Hx Other: PAT Nursing Notes unavailable.                 Anesthesia Plan    ASA 3     general     (Patient understands anesthesia not responsible for dental damage.)  intravenous induction     Anesthetic plan, risks, benefits, and alternatives have been provided, discussed and informed consent has been obtained with: patient.    Use of blood products discussed with patient .    Plan discussed with CRNA.    CODE STATUS:

## 2023-10-23 NOTE — PROGRESS NOTES
Chief Complaint  1 year follow up     Leeanna Jackson is a 63 y.o. female who presents to [facility] for 1 year follow up     History of present Illness  63-year-old patient presents to the office for EGD follow-up with a history of GERD, Wang's esophagus, large hiatal hernia, and colon polyps.  Upper GI symptoms are well controlled when taking omeprazole 40 mg daily and Pepcid as needed.  Recently ran out of omeprazole and had return of symptoms.  Denies nausea, vomiting, epigastric pain, and dysphagia.  Denies lower GI symptoms such as change in bowel habits, constipation, diarrhea, melena, and hematochezia.    EGD 08/14/2023 by Dr. Ny - Short segment and Wang's, 5 cm hiatal hernia, normal stomach and duodenum.  GE junction biopsies-mild chronic inflammation.  Repeat EGD in 3 years      EGD/colonoscopy 7/25/2022 by Dr. Ny - 5cm hiatal hernia, LA Grade C esophagitis, multple gastric polyps, and normal duodenum.  GE junction biopsies - intestinal metaplasia, patient in 1 year EGD recall.  Diverticula in the sigmoid colon otherwise normal mucosa throughout.  Repeat colonoscopy in 5 years.     Past Medical History:   Diagnosis Date    Acid reflux     Wang's esophagus     Bronchitis     Chronic rhinitis     Chronic rhinosinusitis     Foot cramps     Fracture     Headache     Hearing loss     Hiatal hernia     High cholesterol     Hyperlipemia     Hypertension     Thyroid disorder     Tinnitus, bilateral        Past Surgical History:   Procedure Laterality Date    ANUS SURGERY  1999    SPHINCTER REPAIR    APPENDECTOMY      COLONOSCOPY  2016    COLONOSCOPY N/A 07/25/2022    Procedure: COLONOSCOPY;  Surgeon: Jonatan Ny MD;  Location: [facility] ENDOSCOPY;  Service: Gastroenterology;  Laterality: N/A;  DIVERTICULOSIS    ENDOSCOPY  2011, 2016    ENDOSCOPY N/A 07/25/2022    Procedure: ESOPHAGOGASTRODUODENOSCOPY WITH BIOPSIES;  Surgeon: Jonatan Ny  MD [place];  Location: [facility] ENDOSCOPY;  Service: Gastroenterology;  Laterality: N/A;  ESOPHAGITIS, HIATAL HERNIA, FUNDIC GLAND POLYPS    ENDOSCOPY N/A 8/14/2023    Procedure: ESOPHAGOGASTRODUODENOSCOPY with biopsies;  Surgeon: Jonatan Ny MD;  Location: [facility] ENDOSCOPY;  Service: Gastroenterology;  Laterality: N/A;  hiatal hernia, britton's esophagus    HYSTERECTOMY      UPPER GASTROINTESTINAL ENDOSCOPY           Current Outpatient Medications:     ezetimibe (Zetia) 10 MG tablet, Daily., Disp: , Rfl:     gemfibrozil (Lopid) 600 MG tablet, Every 12 (Twelve) Hours., Disp: , Rfl:     losartan (COZAAR) 25 MG tablet, Daily., Disp: , Rfl:     multivitamin with minerals tablet tablet, Take 1 tablet by mouth Daily., Disp: , Rfl:     NON FORMULARY, 2 (Two) Times a Day. PROGESTERONE, TESTOSTERONE, ESTROGEN STEPHEN, Disp: , Rfl:     omeprazole (priLOSEC) 40 MG capsule, Take 1 capsule by mouth Daily., Disp: 90 capsule, Rfl: 3    Thyroid 30 MG PO tablet, Take 1 tablet by mouth Daily. PATIENT REPORTS THAT SHE CAN ONLY TAKE BRAND NAME ARMOUR THYROID. THE GENERIC FORM CAUSES HTN, SOA, PALPITATIONS., Disp: , Rfl:     ergocalciferol (ERGOCALCIFEROL) 1.25 MG (37398 UT) capsule, Take 1 capsule by mouth Every 14 (Fourteen) Days., Disp: , Rfl:     famotidine (Pepcid) 20 MG tablet, Take 1 tablet by mouth every night at bedtime., Disp: 30 tablet, Rfl: 2    fluticasone (FLONASE) 50 MCG/ACT nasal spray, 1 spray into the nostril(s) as directed by provider Daily. (Patient not taking: Reported on 10/25/2023), Disp: , Rfl:     Omega-3 Fatty Acids (fish oil) 500 MG capsule capsule, Daily. (Patient not taking: Reported on 10/25/2023), Disp: , Rfl:      Allergies   Allergen Reactions    Other Other (See Comments)     PATIENT REPORTS THAT SHE CAN ONLY TAKE NAME BRAND ARMOUR THYROID. THE GENERIC FORM CAUSES HTN, SOA, AND PALPITATIONS.       Family History   Problem Relation Age of Onset    Hypertension Mother     Cancer Mother          "unspecified    Breast cancer Mother     Osteoporosis Mother     Cancer Father         unspecified    Prostate cancer Father     Heart disease Brother     Colon cancer Maternal Uncle 50        Malignant,     Malig Hyperthermia Neg Hx         Social History     Social History Narrative    Lives with spouse       Objective       Vital Signs:   /70 (BP Location: Right arm, Patient Position: Sitting, Cuff Size: Adult)   Pulse 74   Ht 152.4 cm (60\")   Wt 65.2 kg (143 lb 11.2 oz)   SpO2 100%   BMI 28.06 kg/m²       Physical Exam  Constitutional:       Appearance: Normal appearance.   HENT:      Head: Normocephalic.   Cardiovascular:      Rate and Rhythm: Normal rate and regular rhythm.      Heart sounds: Normal heart sounds.   Pulmonary:      Effort: Pulmonary effort is normal.      Breath sounds: Normal breath sounds.   Abdominal:      General: Bowel sounds are normal.      Palpations: Abdomen is soft.   Skin:     General: Skin is warm and dry.   Neurological:      Mental Status: She is alert and oriented to person, place, and time. Mental status is at baseline.   Psychiatric:         Mood and Affect: Mood normal.         Behavior: Behavior normal.         Thought Content: Thought content normal.         Judgment: Judgment normal.         Result Review :                   Assessment and Plan    Diagnoses and all orders for this visit:    1. Gastroesophageal reflux disease without esophagitis (Primary)    2. Wang's esophagus without dysplasia    3. Hiatal hernia    4. History of colon polyps    Other orders  -     omeprazole (priLOSEC) 40 MG capsule; Take 1 capsule by mouth Daily.  Dispense: 90 capsule; Refill: 3    63-year-old patient presents to the office for EGD follow-up with a history of GERD, Wang's esophagus, large hiatal hernia, and colon polyps.  Reviewed most recent EGD and pathology report with patient.  Upper GI symptoms are well controlled when taking omeprazole 40 mg daily and Pepcid " as needed.  Refill for omeprazole sent to pharmacy.  Overall patient is doing very well on current medication regimen and wishes to follow-up on an as-needed basis.  Patient will be due for repeat EGD in 2026 and colonoscopy in 2027.  Patient is agreeable to plan will call the office any questions or concerns.    Follow Up   Return if symptoms worsen or fail to improve.  Patient was given instructions and counseling regarding her condition or for health maintenance advice. Please see specific information pulled into the AVS if appropriate.

## 2023-10-25 ENCOUNTER — OFFICE VISIT (OUTPATIENT)
Dept: GASTROENTEROLOGY | Facility: CLINIC | Age: 63
End: 2023-10-25
Payer: OTHER GOVERNMENT

## 2023-10-25 VITALS
SYSTOLIC BLOOD PRESSURE: 121 MMHG | HEIGHT: 60 IN | WEIGHT: 143.7 LBS | BODY MASS INDEX: 28.21 KG/M2 | OXYGEN SATURATION: 100 % | DIASTOLIC BLOOD PRESSURE: 70 MMHG | HEART RATE: 74 BPM

## 2023-10-25 DIAGNOSIS — Z86.010 HISTORY OF COLON POLYPS: ICD-10-CM

## 2023-10-25 DIAGNOSIS — K21.9 GASTROESOPHAGEAL REFLUX DISEASE WITHOUT ESOPHAGITIS: Primary | ICD-10-CM

## 2023-10-25 DIAGNOSIS — K22.70 BARRETT'S ESOPHAGUS WITHOUT DYSPLASIA: ICD-10-CM

## 2023-10-25 DIAGNOSIS — K44.9 HIATAL HERNIA: ICD-10-CM

## 2023-10-25 RX ORDER — OMEPRAZOLE 40 MG/1
40 CAPSULE, DELAYED RELEASE ORAL DAILY
Qty: 90 CAPSULE | Refills: 3 | Status: SHIPPED | OUTPATIENT
Start: 2023-10-25

## 2023-10-25 RX ORDER — GEMFIBROZIL 600 MG/1
TABLET, FILM COATED ORAL EVERY 12 HOURS SCHEDULED
COMMUNITY
Start: 2023-09-18

## 2023-10-25 RX ORDER — EZETIMIBE 10 MG/1
TABLET ORAL EVERY 24 HOURS
COMMUNITY

## 2023-10-25 RX ORDER — OMEGA-3/DHA/EPA/FISH OIL 60 MG-90MG
CAPSULE ORAL DAILY
COMMUNITY

## 2023-10-25 RX ORDER — LOSARTAN POTASSIUM 25 MG/1
TABLET ORAL EVERY 24 HOURS
COMMUNITY
Start: 2023-07-26

## 2023-10-25 RX ORDER — FLUTICASONE PROPIONATE 50 MCG
1 SPRAY, SUSPENSION (ML) NASAL DAILY
COMMUNITY
Start: 2023-07-07

## 2025-05-23 ENCOUNTER — TRANSCRIBE ORDERS (OUTPATIENT)
Dept: ADMINISTRATIVE | Facility: HOSPITAL | Age: 65
End: 2025-05-23
Payer: OTHER GOVERNMENT

## 2025-05-23 DIAGNOSIS — Z12.31 ENCOUNTER FOR SCREENING MAMMOGRAM FOR MALIGNANT NEOPLASM OF BREAST: ICD-10-CM

## 2025-05-23 DIAGNOSIS — Z12.31 OTHER SCREENING MAMMOGRAM: Primary | ICD-10-CM

## 2025-07-30 ENCOUNTER — HOSPITAL ENCOUNTER (OUTPATIENT)
Dept: MAMMOGRAPHY | Facility: HOSPITAL | Age: 65
Discharge: HOME | End: 2025-07-30
Admitting: NURSE PRACTITIONER
Payer: MEDICARE

## 2025-07-30 DIAGNOSIS — Z12.31 ENCOUNTER FOR SCREENING MAMMOGRAM FOR MALIGNANT NEOPLASM OF BREAST: ICD-10-CM

## 2025-07-30 PROCEDURE — 77067 SCR MAMMO BI INCL CAD: CPT

## 2025-07-30 PROCEDURE — 77063 BREAST TOMOSYNTHESIS BI: CPT

## 2025-08-08 ENCOUNTER — TRANSCRIBE ORDERS (OUTPATIENT)
Dept: ADMINISTRATIVE | Facility: HOSPITAL | Age: 65
End: 2025-08-08
Payer: MEDICARE

## 2025-08-08 DIAGNOSIS — M85.80 OSTEOPENIA, SENILE: ICD-10-CM

## 2025-08-08 DIAGNOSIS — E28.39 ESTROGEN DEFICIENCY: ICD-10-CM

## 2025-08-08 DIAGNOSIS — N95.1 MENOPAUSAL STATE: Primary | ICD-10-CM

## 2025-08-08 DIAGNOSIS — I10 BENIGN HYPERTENSION: ICD-10-CM

## 2025-08-08 DIAGNOSIS — R07.9 CHEST PAIN, UNSPECIFIED TYPE: ICD-10-CM

## 2025-08-21 ENCOUNTER — TRANSCRIBE ORDERS (OUTPATIENT)
Dept: ADMINISTRATIVE | Facility: HOSPITAL | Age: 65
End: 2025-08-21
Payer: MEDICARE

## 2025-08-21 DIAGNOSIS — N95.1 MENOPAUSAL STATE: ICD-10-CM

## 2025-08-21 DIAGNOSIS — M85.89 OTHER SPECIFIED DISORDERS OF BONE DENSITY AND STRUCTURE, MULTIPLE SITES: Primary | ICD-10-CM

## 2025-08-21 DIAGNOSIS — E28.39 ESTROGEN DEFICIENCY: ICD-10-CM

## 2025-08-29 ENCOUNTER — HOSPITAL ENCOUNTER (OUTPATIENT)
Dept: CT IMAGING | Facility: HOSPITAL | Age: 65
Discharge: HOME | End: 2025-08-29

## 2025-08-29 ENCOUNTER — HOSPITAL ENCOUNTER (OUTPATIENT)
Dept: BONE DENSITY | Facility: HOSPITAL | Age: 65
Discharge: HOME | End: 2025-08-29

## 2025-08-29 DIAGNOSIS — R07.9 CHEST PAIN, UNSPECIFIED TYPE: ICD-10-CM

## 2025-08-29 DIAGNOSIS — N95.1 MENOPAUSAL STATE: ICD-10-CM

## 2025-08-29 DIAGNOSIS — M85.89 OTHER SPECIFIED DISORDERS OF BONE DENSITY AND STRUCTURE, MULTIPLE SITES: ICD-10-CM

## 2025-08-29 DIAGNOSIS — I10 BENIGN HYPERTENSION: ICD-10-CM

## 2025-08-29 DIAGNOSIS — E28.39 ESTROGEN DEFICIENCY: ICD-10-CM

## 2025-08-29 PROCEDURE — 75571 CT HRT W/O DYE W/CA TEST: CPT

## 2025-08-29 PROCEDURE — 77080 DXA BONE DENSITY AXIAL: CPT

## (undated) DEVICE — COLON KIT: Brand: MEDLINE INDUSTRIES, INC.

## (undated) DEVICE — Device: Brand: DEFENDO AIR/WATER/SUCTION AND BIOPSY VALVE

## (undated) DEVICE — SOLIDIFIER LIQLOC PLS 1500CC BT

## (undated) DEVICE — LINER SURG CANSTR SXN S/RIGD 1500CC

## (undated) DEVICE — SOL IRRG H2O PL/BG 1000ML STRL

## (undated) DEVICE — EGD OR ERCP KIT: Brand: MEDLINE INDUSTRIES, INC.

## (undated) DEVICE — Device

## (undated) DEVICE — BLCK/BITE BLOX WO/DENTL/RIM W/STRAP 54F

## (undated) DEVICE — CONN JET HYDRA H20 AUXILIARY DISP